# Patient Record
Sex: FEMALE | Race: WHITE | NOT HISPANIC OR LATINO | Employment: OTHER | URBAN - METROPOLITAN AREA
[De-identification: names, ages, dates, MRNs, and addresses within clinical notes are randomized per-mention and may not be internally consistent; named-entity substitution may affect disease eponyms.]

---

## 2017-01-30 ENCOUNTER — VIRTUAL VISIT (OUTPATIENT)
Dept: FAMILY MEDICINE | Facility: OTHER | Age: 62
End: 2017-01-30

## 2017-01-30 NOTE — PROGRESS NOTES
"Date: [id number]  Clinician: Joel Wegener  Clinician NPI: [id number]  Patient: Hilda Claire  Patient : 1955  Patient Address: [address] [place] [address]  Patient Phone: [phone]  Visit Protocol: URI  Patient Summary:  Hilda is a 61 year old ( : 1955 ) female who initiated a Zip for significant cough. When asked the question \"Do you have a [place] primary care physician?\", Hilda responded \"Yes\".     Her  symptoms started gradually 8-9 days ago  and consist of nasal congestion and cough.   She denies dysphagia, nausea, vomiting, itchy eyes, chills, malaise, fever, sore throat, hoarse voice, ear pain, dyspnea, post-nasal drainage, rhinitis, headache, facial pain or pressure, myalgias, chest pain, and loss of appetite. She denies a history of facial surgery.   Her minimal nasal secretions are clear. When asked to feel her neck she denied feeling enlarged lymph nodes. She denies axillary lymphadenopathy.   Her mild (a few coughs/hr) non-productive paroxysmal cough is more bothersome at night. Hilda reports having long periods without coughing but short periods of uncontrollable coughing. She feels fairly well when not coughing. She denies having trouble catching her breath when coughing, noticing a loud whooping sound if trying to catch her breath during a coughing fit and vomiting after a coughing episode since the cough began.  Hilda   She has tried medications to help the cough and found them to be partially effective.    Hilda denies having COPD or other chronic lung disease.   Pulse: self-reported pulse rate as: 10 beats in 10 seconds.    Weight (in lbs): 170   She states she is not pregnant and denies breastfeeding. She no longer menstruates.   Hilda does not smoke or use smokeless tobacco.   MEDICATIONS:  Thyroid, metoprolol (Lopressor/Toprol), and atorvastatin (Lipitor)   Patient free text response:  Tirosent  , ALLERGIES:  NKDA   Clinician Response: "  Dear Hilda,  Based on the information you have provided, you likely have a viral upper respiratory infection, otherwise known as a 'cold'.   Sinus pressure occurs when the tissues lining your sinuses become swollen and inflamed. Afrin nasal spray decreases the swelling to provide the quickest and most effective relief from sinus pressure.  Use oxymetazoline (Afrin, or store brand) nasal spray. Spray once in each nostril twice per day for a maximum of 3 days. Using this medication more frequently or longer than recommended may cause nasal congestion to reoccur or worsen. This is an over-the-counter medication you can find at most pharmacies.   Unless your are allergic to the over-the-counter medication(s) below, I recommend using:   Guaifenesin + dextromethorphan (Robitussin DM, Mucinex DM). This is an over-the-counter medication that does not require a prescription.   A decongestant such as pseudoephedrine (Sudafed or store brand) to help your symptoms. This is an over-the-counter medication that does not require a prescription.   Ibuprofen. Take 1-3 200mg tablets (200-600mg) every 8 hours to help with the discomfort. Make sure to take the ibuprofen with food. Do not exceed 2400mg in 24 hours. This is an over-the-counter medication that does not require a prescription.   Because your condition is most likely caused by a virus, antibiotics will not help you get better. Inappropriately treating a viral infection with antibiotics may cause harmful side-effects. In fact, antibiotics may make you feel worse.  For more information on why I am not prescribing antibiotics, please watch this video: Antibiotics Won't Help You.   Drink plenty of liquids, especially water and take time to rest your body. This may mean taking a nap or going to bed earlier. Your body is fighting an infection and liquids and rest will improve the pace of recovery. Remember to regularly wash your hands and avoid close contact with others to  prevent spreading your infection.  I am providing you with a promo code for a free visit. This code will  in two weeks and may only be used once. Please redeem your free visit by entering the following promo code on the payment screen: [id number]   Diagnosis: Viral URI  Diagnosis ICD: J06.9  Diagnosis ICD: 462.0

## 2017-03-21 ENCOUNTER — HOSPITAL ENCOUNTER (OUTPATIENT)
Dept: ULTRASOUND IMAGING | Facility: CLINIC | Age: 62
Discharge: HOME | End: 2017-03-21
Attending: PHYSICIAN ASSISTANT | Admitting: PHYSICIAN ASSISTANT
Payer: COMMERCIAL

## 2017-03-21 DIAGNOSIS — E04.1 THYROID NODULE: ICD-10-CM

## 2017-03-21 PROCEDURE — 76536 US EXAM OF HEAD AND NECK: CPT

## 2017-03-22 ENCOUNTER — TELEPHONE (OUTPATIENT)
Dept: FAMILY MEDICINE | Facility: CLINIC | Age: 62
End: 2017-03-22

## 2017-03-22 DIAGNOSIS — E06.3 HASHIMOTO'S THYROIDITIS: ICD-10-CM

## 2017-03-22 DIAGNOSIS — E04.1 THYROID NODULE: ICD-10-CM

## 2017-03-22 RX ORDER — LEVOTHYROXINE SODIUM 25 UG/1
25 CAPSULE ORAL DAILY
Qty: 90 CAPSULE | Refills: 0 | Status: SHIPPED | OUTPATIENT
Start: 2017-03-22 | End: 2017-06-14

## 2017-03-22 NOTE — TELEPHONE ENCOUNTER
Tirosint 25g        Last Written Prescription Date: 11/16/16  Last Fill Quantity: 90,  # refills: 0   Last Office Visit with G, P or [facility] prescribing provider: 11/11/16    Juan Daniel Ramos Pharmacy Technician  [facility]

## 2017-03-22 NOTE — TELEPHONE ENCOUNTER
Please do not close this encounter until this has been addressed.  (prior auth approved/denied, prescriber refusal to complete prior auth or medication changed/discontinued)    Prior Authorization needed on: Tirosint 25G  Drug NDC: [address]-19     Insurance: [employer]  Member ID: [id number]   Insurance phone #: [phone]    Pharmacy NPI: [id number]  Pharmacy Phone #: [phone]  Pharmacy Fax #: [phone]    Please let us know if the PA gets approved or denied or if medication is changed.     Juan Daniel Ramos Pharmacy Technician  [facility]

## 2017-03-22 NOTE — PROGRESS NOTES
Cristina Stevens,    Your thyroid ultrasound is stable, no change.  You do not need to repeat any further ultrasounds at this time.    Please call clinic at [phone] if you have questions.    Brionna Gonzalez PA-C

## 2017-06-14 DIAGNOSIS — E06.3 HASHIMOTO'S THYROIDITIS: ICD-10-CM

## 2017-06-14 DIAGNOSIS — E04.1 THYROID NODULE: ICD-10-CM

## 2017-06-14 RX ORDER — LEVOTHYROXINE SODIUM 25 UG/1
CAPSULE ORAL
Qty: 7 CAPSULE | Refills: 0 | Status: SHIPPED | OUTPATIENT
Start: 2017-06-14 | End: 2017-06-16 | Stop reason: DRUGHIGH

## 2017-06-14 NOTE — TELEPHONE ENCOUNTER
Needs lab before further refills.  1 refilled x only 1 week since this lab is extremely overdue, was to be done in 6-8 weeks after her Nov visit.

## 2017-06-14 NOTE — TELEPHONE ENCOUNTER
TIROSINT 25 MCG CAPS      Last Written Prescription Date:  3/22/17  Last Fill Quantity: 90,   # refills: 0  Last Office Visit with [facility], [facility] or  Health prescribing provider: 1/11/16  Future Office visit:       Routing refill request to provider for review/approval because:  Drug not on the [facility], [facility] or  [facility] refill protocol or controlled substance

## 2017-06-15 DIAGNOSIS — E04.1 THYROID NODULE: ICD-10-CM

## 2017-06-15 DIAGNOSIS — E06.3 HASHIMOTO'S THYROIDITIS: ICD-10-CM

## 2017-06-15 LAB
T4 FREE SERPL-MCNC: 0.73 NG/DL (ref 0.76–1.46)
TSH SERPL DL<=0.005 MIU/L-ACNC: 16.63 MU/L (ref 0.4–4)

## 2017-06-15 PROCEDURE — 36415 COLL VENOUS BLD VENIPUNCTURE: CPT | Performed by: PHYSICIAN ASSISTANT

## 2017-06-15 PROCEDURE — 84439 ASSAY OF FREE THYROXINE: CPT | Performed by: PHYSICIAN ASSISTANT

## 2017-06-15 PROCEDURE — 84443 ASSAY THYROID STIM HORMONE: CPT | Performed by: PHYSICIAN ASSISTANT

## 2017-06-16 DIAGNOSIS — E06.3 HYPOTHYROIDISM DUE TO HASHIMOTO'S THYROIDITIS: Primary | ICD-10-CM

## 2017-06-16 RX ORDER — LEVOTHYROXINE SODIUM 50 UG/1
50 CAPSULE ORAL DAILY
Qty: 60 CAPSULE | Refills: 0 | Status: SHIPPED | OUTPATIENT
Start: 2017-06-16 | End: 2017-08-21

## 2017-06-16 RX ORDER — LEVOTHYROXINE SODIUM 50 UG/1
50 TABLET ORAL DAILY
Qty: 60 TABLET | Refills: 0 | Status: SHIPPED | OUTPATIENT
Start: 2017-06-16 | End: 2017-06-16

## 2017-06-16 NOTE — PROGRESS NOTES
Cristina Stevens,    Your thyroid lab still shows that we need to adjust dose further.  I've sent the new dose into the [facility] pharmacy - 2 months of medication, since you need a lab in 6-8 weeks.  We can send elsewhere if you prefer.    Please call clinic at [phone] if you have questions.    Brionna Gonzalez PA-C

## 2017-08-21 DIAGNOSIS — E06.3 HYPOTHYROIDISM DUE TO HASHIMOTO'S THYROIDITIS: ICD-10-CM

## 2017-08-22 RX ORDER — LEVOTHYROXINE SODIUM 50 UG/1
CAPSULE ORAL
Qty: 56 CAPSULE | Refills: 0 | Status: SHIPPED | OUTPATIENT
Start: 2017-08-22 | End: 2017-10-12

## 2017-08-22 NOTE — TELEPHONE ENCOUNTER
tirosint      Last Written Prescription Date:    Last Fill Quantity: ,   # refills:   Last Office Visit with G, P or [facility] prescribing provider: 11/11/2016  Future Office visit:       Routing refill request to provider for review/approval because:  Drug not active on patient's medication list

## 2017-10-12 DIAGNOSIS — E06.3 HYPOTHYROIDISM DUE TO HASHIMOTO'S THYROIDITIS: ICD-10-CM

## 2017-10-13 RX ORDER — LEVOTHYROXINE SODIUM 50 UG/1
CAPSULE ORAL
Qty: 14 CAPSULE | Refills: 0 | Status: SHIPPED | OUTPATIENT
Start: 2017-10-13 | End: 2017-10-18 | Stop reason: DRUGHIGH

## 2017-10-13 NOTE — TELEPHONE ENCOUNTER
TIROSINT 50 MCG CAPS      Last Written Prescription Date:  8/22/2017  Last Fill Quantity: 56,   # refills: 0  Last Office Visit with [facility], P or  Health prescribing provider: 11/11/2016  Future Office visit:       Routing refill request to provider for review/approval because:  Drug not on the [facility], [facility] or  Health refill protocol or controlled substance

## 2017-10-17 DIAGNOSIS — N18.30 CKD (CHRONIC KIDNEY DISEASE) STAGE 3, GFR 30-59 ML/MIN (H): ICD-10-CM

## 2017-10-17 DIAGNOSIS — E06.3 HASHIMOTO'S THYROIDITIS: ICD-10-CM

## 2017-10-17 DIAGNOSIS — E06.3 HYPOTHYROIDISM DUE TO HASHIMOTO'S THYROIDITIS: ICD-10-CM

## 2017-10-17 PROCEDURE — 84443 ASSAY THYROID STIM HORMONE: CPT | Performed by: PHYSICIAN ASSISTANT

## 2017-10-17 PROCEDURE — 36415 COLL VENOUS BLD VENIPUNCTURE: CPT | Performed by: PHYSICIAN ASSISTANT

## 2017-10-18 LAB — TSH SERPL DL<=0.005 MIU/L-ACNC: 13.9 MU/L (ref 0.4–4)

## 2017-10-18 RX ORDER — LEVOTHYROXINE SODIUM 75 UG/1
75 CAPSULE ORAL DAILY
Qty: 90 CAPSULE | Refills: 0 | Status: SHIPPED | OUTPATIENT
Start: 2017-10-18 | End: 2017-12-29

## 2017-10-18 NOTE — PROGRESS NOTES
Cristina Stevens,    Your thyroid lab shows that you need a dose increase.  I know that you hadn't wanted any dose increases, but lab shows that we do need to do this.  I've sent 90 days worth of medication to pharmacy.  You must do lab in 6-12 weeks, and see me yearly (due in Nov), to refill.      Please call clinic at [phone] if you have questions.    Brionna Gonzalez PA-C

## 2017-11-17 ENCOUNTER — OFFICE VISIT (OUTPATIENT)
Dept: FAMILY MEDICINE | Facility: CLINIC | Age: 62
End: 2017-11-17
Payer: COMMERCIAL

## 2017-11-17 VITALS
DIASTOLIC BLOOD PRESSURE: 86 MMHG | TEMPERATURE: 98.3 F | HEIGHT: 62 IN | BODY MASS INDEX: 33.13 KG/M2 | HEART RATE: 60 BPM | SYSTOLIC BLOOD PRESSURE: 132 MMHG | WEIGHT: 180 LBS

## 2017-11-17 DIAGNOSIS — E78.5 DYSLIPIDEMIA: ICD-10-CM

## 2017-11-17 DIAGNOSIS — Z86.73 HISTORY OF STROKE: ICD-10-CM

## 2017-11-17 DIAGNOSIS — N18.30 CKD (CHRONIC KIDNEY DISEASE) STAGE 3, GFR 30-59 ML/MIN (H): ICD-10-CM

## 2017-11-17 DIAGNOSIS — R73.03 PREDIABETES: ICD-10-CM

## 2017-11-17 DIAGNOSIS — E06.3 HYPOTHYROIDISM DUE TO HASHIMOTO'S THYROIDITIS: ICD-10-CM

## 2017-11-17 DIAGNOSIS — R00.1 BRADYCARDIA: ICD-10-CM

## 2017-11-17 DIAGNOSIS — Z00.00 ROUTINE PHYSICAL EXAMINATION: Primary | ICD-10-CM

## 2017-11-17 DIAGNOSIS — I10 ESSENTIAL HYPERTENSION WITH GOAL BLOOD PRESSURE LESS THAN 140/90: ICD-10-CM

## 2017-11-17 LAB
CHOLEST SERPL-MCNC: 171 MG/DL
CREAT SERPL-MCNC: 1.03 MG/DL (ref 0.52–1.04)
CREAT UR-MCNC: 16 MG/DL
GFR SERPL CREATININE-BSD FRML MDRD: 55 ML/MIN/1.7M2
HBA1C MFR BLD: 6.4 % (ref 4.3–6)
HDLC SERPL-MCNC: 71 MG/DL
HGB BLD-MCNC: 13.8 G/DL (ref 11.7–15.7)
LDLC SERPL CALC-MCNC: 61 MG/DL
MICROALBUMIN UR-MCNC: <5 MG/L
MICROALBUMIN/CREAT UR: NORMAL MG/G CR (ref 0–25)
NONHDLC SERPL-MCNC: 100 MG/DL
TRIGL SERPL-MCNC: 195 MG/DL

## 2017-11-17 PROCEDURE — 83036 HEMOGLOBIN GLYCOSYLATED A1C: CPT | Performed by: PHYSICIAN ASSISTANT

## 2017-11-17 PROCEDURE — 82565 ASSAY OF CREATININE: CPT | Performed by: PHYSICIAN ASSISTANT

## 2017-11-17 PROCEDURE — 99396 PREV VISIT EST AGE 40-64: CPT | Performed by: PHYSICIAN ASSISTANT

## 2017-11-17 PROCEDURE — 82043 UR ALBUMIN QUANTITATIVE: CPT | Performed by: PHYSICIAN ASSISTANT

## 2017-11-17 PROCEDURE — 99214 OFFICE O/P EST MOD 30 MIN: CPT | Mod: 25 | Performed by: PHYSICIAN ASSISTANT

## 2017-11-17 PROCEDURE — 85018 HEMOGLOBIN: CPT | Performed by: PHYSICIAN ASSISTANT

## 2017-11-17 PROCEDURE — 80061 LIPID PANEL: CPT | Performed by: PHYSICIAN ASSISTANT

## 2017-11-17 PROCEDURE — 36415 COLL VENOUS BLD VENIPUNCTURE: CPT | Performed by: PHYSICIAN ASSISTANT

## 2017-11-17 RX ORDER — METOPROLOL TARTRATE 50 MG
TABLET ORAL
Qty: 60 TABLET | Refills: 0 | Status: SHIPPED | OUTPATIENT
Start: 2017-11-17 | End: 2018-04-03

## 2017-11-17 RX ORDER — AMLODIPINE BESYLATE 5 MG/1
5 TABLET ORAL DAILY
Qty: 90 TABLET | Refills: 3 | Status: CANCELLED | OUTPATIENT
Start: 2017-11-17

## 2017-11-17 RX ORDER — ATORVASTATIN CALCIUM 80 MG/1
80 TABLET, FILM COATED ORAL DAILY
Qty: 90 TABLET | Refills: 3 | Status: SHIPPED | OUTPATIENT
Start: 2017-11-17 | End: 2018-11-16

## 2017-11-17 RX ORDER — LEVOTHYROXINE SODIUM 50 UG/1
CAPSULE ORAL
Qty: 90 CAPSULE | Refills: 3 | Status: SHIPPED | OUTPATIENT
Start: 2017-11-17 | End: 2018-10-17

## 2017-11-17 RX ORDER — LISINOPRIL 20 MG/1
20 TABLET ORAL DAILY
Qty: 30 TABLET | Refills: 0 | Status: SHIPPED | OUTPATIENT
Start: 2017-11-17 | End: 2017-12-27

## 2017-11-17 NOTE — NURSING NOTE
"Chief Complaint   Patient presents with     Physical       Initial /86 (BP Location: Right arm, Patient Position: Chair, Cuff Size: Adult Large)  Pulse 60  Temp 98.3  F (36.8  C) (Tympanic)  Ht 5' 2\" (1.575 m)  Wt 180 lb (81.6 kg)  LMP 06/02/2008  BMI 32.92 kg/m2 Estimated body mass index is 32.92 kg/(m^2) as calculated from the following:    Height as of this encounter: 5' 2\" (1.575 m).    Weight as of this encounter: 180 lb (81.6 kg).  Medication Reconciliation: complete    Health Maintenance that is potentially due pending provider review:  Mammogram and Colonoscopy/FIT    Gave pt phone number/pended order to schedule mammo and/or colonoscopy(or FIT)    Is there anyone who you would like to be able to receive your results? No  If yes have patient fill out REMEDIOS    Edie BRANCH MA      "

## 2017-11-17 NOTE — MR AVS SNAPSHOT
After Visit Summary   11/17/2017    Hilda Claire    MRN: [id number]           Patient Information     Date Of Birth          1955        Visit Information        Provider Department      11/17/2017 9:40 AM Brionna Gonzalez PA-C [facility]        Today's Diagnoses     Routine physical examination    -  1    Bradycardia        CKD (chronic kidney disease) stage 3, GFR 30-59 ml/min        Essential hypertension with goal blood pressure less than 140/90        Hypothyroidism due to Hashimoto's thyroiditis        Prediabetes        History of stroke        Dyslipidemia          Care Instructions    1 tab daily.  This a lower dose than my medical recommendation but what patient is willing to take.  Advising to see endocrinology.    Decrease metoprolol to 0.5 tab twice daily.  Update me on BPs AND pulses within the month for refills.    Due to tired kidneys, we prefer lisinopril as a BP med.  Therefore start lisinopril and stop amlodipine.  Lab again in 3-4 wks - but make sure BP is doing well first.        Tetanus shot due in June.    Think about flu shot.    In a few months consider NEW shingles vaccine shingrix.  Check with insurance first or get at our pharmacy.      Keep up your awesome exercise.      Consider mail the poop test.      [facility] Mammo Schedule  [facility] ~ [phone]  One Day Weekly- Alternating Days    [place] ~ [phone]  Every other Monday or Wednesday   & one Saturday morning a month    [place] ~ [phone]  Every Other Monday Morning    [place] ~ [phone]  Every Other Monday Afternoon    [place] ~ [phone]  Every Monday morning  Every Tuesday afternoon      Wed, Thurs, Friday morning & afternoon        Preventive Health Recommendations  Female Ages 50 - 64    Yearly exam: See your health care provider every year in order to  o Review health changes.   o Discuss preventive care.    o Review your medicines if your doctor has  prescribed any.      Get a Pap test every three years (unless you have an abnormal result and your provider advises testing more often).    If you get Pap tests with HPV test, you only need to test every 5 years, unless you have an abnormal result.     You do not need a Pap test if your uterus was removed (hysterectomy) and you have not had cancer.    You should be tested each year for STDs (sexually transmitted diseases) if you're at risk.     Have a mammogram every 1 to 2 years.    Have a colonoscopy at age 50, or have a yearly FIT test (stool test). These exams screen for colon cancer.      Have a cholesterol test every 5 years, or more often if advised.    Have a diabetes test (fasting glucose) every three years. If you are at risk for diabetes, you should have this test more often.     If you are at risk for osteoporosis (brittle bone disease), think about having a bone density scan (DEXA).    Shots: Get a flu shot each year. Get a tetanus shot every 10 years.    Nutrition:     Eat at least 5 servings of fruits and vegetables each day.    Eat whole-grain bread, whole-wheat pasta and brown rice instead of white grains and rice.    Talk to your provider about Calcium and Vitamin D.     Lifestyle    Exercise at least 150 minutes a week (30 minutes a day, 5 days a week). This will help you control your weight and prevent disease.    Limit alcohol to one drink per day.    No smoking.     Wear sunscreen to prevent skin cancer.     See your dentist every six months for an exam and cleaning.    See your eye doctor every 1 to 2 years.            Follow-ups after your visit        Who to contact     If you have questions or need follow up information about today's clinic visit or your schedule please contact [facility] directly at [phone].  Normal or non-critical lab and imaging results will be communicated to you by MyChart, letter or phone within 4 business days after the clinic has received the  "results. If you do not hear from us within 7 days, please contact the clinic through [employer] or phone. If you have a critical or abnormal lab result, we will notify you by phone as soon as possible.  Submit refill requests through [employer] or call your pharmacy and they will forward the refill request to us. Please allow 3 business days for your refill to be completed.          Additional Information About Your Visit        [employer] Information     [employer] gives you secure access to your electronic health record. If you see a primary care provider, you can also send messages to your care team and make appointments. If you have questions, please call your primary care clinic.  If you do not have a primary care provider, please call [phone] and they will assist you.        Care EveryWhere ID     This is your Care EveryWhere ID. This could be used by other organizations to access your [facility] medical records  [id number]        Your Vitals Were     Pulse Temperature Height Last Period BMI (Body Mass Index)       60 98.3  F (36.8  C) (Tympanic) 5' 2\" (1.575 m) 06/02/2008 32.92 kg/m2        Blood Pressure from Last 3 Encounters:   11/17/17 132/86   11/11/16 128/76   03/01/16 108/56    Weight from Last 3 Encounters:   11/17/17 180 lb (81.6 kg)   11/11/16 182 lb 9.6 oz (82.8 kg)   01/21/16 174 lb 14.4 oz (79.3 kg)              We Performed the Following     Albumin Random Urine Quantitative with Creat Ratio     Creatinine     Hemoglobin A1c     Hemoglobin     Lipid panel reflex to direct LDL Fasting          Today's Medication Changes          These changes are accurate as of: 11/17/17 10:32 AM.  If you have any questions, ask your nurse or doctor.               Start taking these medicines.        Dose/Directions    lisinopril 20 MG tablet   Commonly known as:  PRINIVIL/ZESTRIL   Used for:  CKD (chronic kidney disease) stage 3, GFR 30-59 ml/min, Essential hypertension with goal blood pressure less than 140/90 "   Started by:  Brionna Gonzalez PA-C        Dose:  20 mg   Take 1 tablet (20 mg) by mouth daily   Quantity:  30 tablet   Refills:  0         These medicines have changed or have updated prescriptions.        Dose/Directions    * Levothyroxine Sodium 75 MCG Caps   Commonly known as:  TIROSINT   This may have changed:  Another medication with the same name was added. Make sure you understand how and when to take each.        Dose:  75 mcg   Take 0.075 mg by mouth daily Lab in 6-12 weeks for any refills.   Quantity:  90 capsule   Refills:  0       * TIROSINT 50 MCG Caps   This may have changed:  You were already taking a medication with the same name, and this prescription was added. Make sure you understand how and when to take each.   Used for:  Hypothyroidism due to Hashimoto's thyroiditis   Generic drug:  Levothyroxine Sodium   Changed by:  Brionna Gonzalez PA-C        1 tab daily.  This a lower dose than my medical recommendation but what patient is willing to take.  Advising to see endocrinology.   Quantity:  90 capsule   Refills:  3       metoprolol 50 MG tablet   Commonly known as:  LOPRESSOR   This may have changed:  additional instructions   Used for:  Essential hypertension with goal blood pressure less than 140/90   Changed by:  Brionna Gonzalez PA-C        Take 0.5 tablet in am and 0.5 tablets in pm   Quantity:  60 tablet   Refills:  0       * Notice:  This list has 2 medication(s) that are the same as other medications prescribed for you. Read the directions carefully, and ask your doctor or other care provider to review them with you.         Where to get your medicines      These medications were sent to [facility] [place] [id number] [place] [address]     Phone:  [phone]     atorvastatin 80 MG tablet    lisinopril 20 MG tablet    metoprolol 50 MG tablet    TIROSINT 50 MCG Caps                Primary Care Provider  Office Phone # Fax #    Brionna Gonzalez PA-C [phone] [address] [place] [address]        Equal Access to Services     GISELLE ALMEIDA : Shelby onel atkins igor Duffy, washirazda luqadaha, qaybta kaalmada loida, juan garciabrigitte holloway. So [facility] [phone].    ATENCIÓN: Si habla español, tiene a barry disposición servicios gratuitos de asistencia lingüística. Llame al [phone].    We comply with applicable federal civil rights laws and [place] laws. We do not discriminate on the basis of race, color, national origin, age, disability, sex, sexual orientation, or gender identity.            Thank you!     Thank you for choosing [facility]  for your care. Our goal is always to provide you with excellent care. Hearing back from our patients is one way we can continue to improve our services. Please take a few minutes to complete the written survey that you may receive in the mail after your visit with us. Thank you!             Your Updated Medication List - Protect others around you: Learn how to safely use, store and throw away your medicines at www.disposemymeds.org.          This list is accurate as of: 11/17/17 10:32 AM.  Always use your most recent med list.                   Brand Name Dispense Instructions for use Diagnosis    amLODIPine 5 MG tablet    NORVASC    90 tablet    Take 1 tablet (5 mg) by mouth daily    Essential hypertension with goal blood pressure less than 140/90       aspirin  MG EC tablet      Take 1 tablet (325 mg) by mouth daily For stroke prevention.    Cerebrovascular accident (CVA) due to other mechanism (H)       atorvastatin 80 MG tablet    LIPITOR    90 tablet    Take 1 tablet (80 mg) by mouth daily    Dyslipidemia       * Levothyroxine Sodium 75 MCG Caps    TIROSINT    90 capsule    Take 0.075 mg by mouth daily Lab in 6-12 weeks for any refills.        * TIROSINT 50 MCG Caps   Generic drug:  Levothyroxine  Sodium     90 capsule    1 tab daily.  This a lower dose than my medical recommendation but what patient is willing to take.  Advising to see endocrinology.    Hypothyroidism due to Hashimoto's thyroiditis       lisinopril 20 MG tablet    PRINIVIL/ZESTRIL    30 tablet    Take 1 tablet (20 mg) by mouth daily    CKD (chronic kidney disease) stage 3, GFR 30-59 ml/min, Essential hypertension with goal blood pressure less than 140/90       loperamide 2 MG capsule    IMODIUM     Take 2 mg by mouth 4 times daily as needed for diarrhea        metoprolol 50 MG tablet    LOPRESSOR    60 tablet    Take 0.5 tablet in am and 0.5 tablets in pm    Essential hypertension with goal blood pressure less than 140/90       * Notice:  This list has 2 medication(s) that are the same as other medications prescribed for you. Read the directions carefully, and ask your doctor or other care provider to review them with you.

## 2017-11-17 NOTE — PATIENT INSTRUCTIONS
1 tab daily.  This a lower dose than my medical recommendation but what patient is willing to take.  Advising to see endocrinology.    Decrease metoprolol to 0.5 tab twice daily.  Update me on BPs AND pulses within the month for refills.    Due to tired kidneys, we prefer lisinopril as a BP med.  Therefore start lisinopril and stop amlodipine.  Lab again in 3-4 wks - but make sure BP is doing well first.        Tetanus shot due in June.    Think about flu shot.    In a few months consider NEW shingles vaccine shingrix.  Check with insurance first or get at our pharmacy.      Keep up your awesome exercise.      Consider mail the poop test.      [facility] Mammo Schedule  [facility] ~ [phone]  One Day Weekly- Alternating Days    [place] ~ [phone]  Every other Monday or Wednesday   & one Saturday morning a month    [place] ~ [phone]  Every Other Monday Morning    [place] ~ [phone]  Every Other Monday Afternoon    [place] ~ [phone]  Every Monday morning  Every Tuesday afternoon      Wed, Thurs, Friday morning & afternoon        Preventive Health Recommendations  Female Ages 50 - 64    Yearly exam: See your health care provider every year in order to  o Review health changes.   o Discuss preventive care.    o Review your medicines if your doctor has prescribed any.      Get a Pap test every three years (unless you have an abnormal result and your provider advises testing more often).    If you get Pap tests with HPV test, you only need to test every 5 years, unless you have an abnormal result.     You do not need a Pap test if your uterus was removed (hysterectomy) and you have not had cancer.    You should be tested each year for STDs (sexually transmitted diseases) if you're at risk.     Have a mammogram every 1 to 2 years.    Have a colonoscopy at age 50, or have a yearly FIT test (stool test). These exams screen for colon cancer.      Have a cholesterol test every 5 years, or more  often if advised.    Have a diabetes test (fasting glucose) every three years. If you are at risk for diabetes, you should have this test more often.     If you are at risk for osteoporosis (brittle bone disease), think about having a bone density scan (DEXA).    Shots: Get a flu shot each year. Get a tetanus shot every 10 years.    Nutrition:     Eat at least 5 servings of fruits and vegetables each day.    Eat whole-grain bread, whole-wheat pasta and brown rice instead of white grains and rice.    Talk to your provider about Calcium and Vitamin D.     Lifestyle    Exercise at least 150 minutes a week (30 minutes a day, 5 days a week). This will help you control your weight and prevent disease.    Limit alcohol to one drink per day.    No smoking.     Wear sunscreen to prevent skin cancer.     See your dentist every six months for an exam and cleaning.    See your eye doctor every 1 to 2 years.

## 2017-11-17 NOTE — PROGRESS NOTES
SUBJECTIVE:   CC: Hilda Claire is an 62 year old woman who presents for preventive health visit.     Physical   Annual:     Getting at least 3 servings of Calcium per day::  Yes    Bi-annual eye exam::  Yes    Dental care twice a year::  Yes    Sleep apnea or symptoms of sleep apnea::  None    Diet::  Regular (no restrictions)    Frequency of exercise::  4-5 days/week    Duration of exercise::  15-30 minutes    Taking medications regularly::  Yes    Additional concerns today::  No    History of stroke.  HTN.  130s systolic.  No chest pains, chest pressures, SOB or sudden change of endurance.   Notes pulse sometimes in 60s and even 50s but asymptomatic.   CKD3.  She was unaware of this.      Hypothyroid.  See notes from last yr.  Patient dislikes thyroid med and is currently not taking.  Only willing to take lower dose which she felt she tolerated, and not willing to consider seeing endocrine at this time.  Feels she has no symptoms.    Declines mammogram and FIT.  Might get around to mammo but states no to FIT for now.    Tetanus due in June.    Works for [employer].    Today's PHQ-2 Score:   PHQ-2 ( 1999 Pfizer) 11/16/2017   Q1: Little interest or pleasure in doing things 0   Q2: Feeling down, depressed or hopeless 0   PHQ-2 Score 0   Q1: Little interest or pleasure in doing things Not at all   Q2: Feeling down, depressed or hopeless Not at all   PHQ-2 Score 0       Abuse: Current or Past(Physical, Sexual or Emotional)- No  Do you feel safe in your environment - Yes    Social History   Substance Use Topics     Smoking status: Never Smoker     Smokeless tobacco: Not on file     Alcohol use Yes      Comment: occ     The patient does not drink >3 drinks per day nor >7 drinks per week.    Reviewed orders with patient.  Reviewed health maintenance and updated orders accordingly - Yes  Labs reviewed in EPIC  BP Readings from Last 3 Encounters:   11/17/17 132/86   11/11/16 128/76  "  03/01/16 108/56    Wt Readings from Last 3 Encounters:   11/17/17 180 lb (81.6 kg)   11/11/16 182 lb 9.6 oz (82.8 kg)   01/21/16 174 lb 14.4 oz (79.3 kg)         Patient over age 50, mutual decision to screen reflected in health maintenance.      Pertinent mammograms are reviewed under the imaging tab.  History of abnormal Pap smear: NO - age 30-65 PAP every 5 years with negative HPV co-testing recommended    Reviewed and updated as needed this visit by clinical staff         Reviewed and updated as needed this visit by Provider          Review of Systems  C: NEGATIVE for fever, chills, change in weight  I: NEGATIVE for worrisome rashes, moles or lesions  E: NEGATIVE for vision changes or irritation  ENT: NEGATIVE for ear, mouth and throat problems  R: NEGATIVE for significant cough or SOB  B: NEGATIVE for masses, tenderness or discharge  CV: NEGATIVE for chest pain, palpitations or peripheral edema  GI: NEGATIVE for nausea, abdominal pain, heartburn, or change in bowel habits  : NEGATIVE for unusual urinary or vaginal symptoms. No vaginal bleeding.  M: NEGATIVE for significant arthralgias or myalgia  N: NEGATIVE for weakness, dizziness or paresthesias  P: NEGATIVE for changes in mood or affect     ROS is negative except as listed above in ROS or in HPI.     OBJECTIVE:   /86 (BP Location: Right arm, Patient Position: Chair, Cuff Size: Adult Large)  Pulse 60  Temp 98.3  F (36.8  C) (Tympanic)  Ht 5' 2\" (1.575 m)  Wt 180 lb (81.6 kg)  LMP 06/02/2008  BMI 32.92 kg/m2  Physical Exam  GENERAL APPEARANCE: healthy, alert and no distress  EYES: Eyes grossly normal to inspection, PERRL and conjunctivae and sclerae normal  HENT: ear canals and TM's normal, nose and mouth without ulcers or lesions, oropharynx clear and oral mucous membranes moist  NECK: no adenopathy, no asymmetry, masses, or scars and thyroid normal to palpation  RESP: lungs clear to auscultation - no rales, rhonchi or wheezes  BREAST: normal " "without masses, tenderness or nipple discharge and no palpable axillary masses or adenopathy  CV: regular rate and rhythm, normal S1 S2, no S3 or S4, no murmur, click or rub, no peripheral edema and peripheral pulses strong  ABDOMEN: soft, nontender, no hepatosplenomegaly, no masses and bowel sounds normal  MS: no musculoskeletal defects are noted and gait is age appropriate without ataxia  SKIN: no suspicious lesions or rashes  NEURO: Normal strength and tone, sensory exam grossly normal, mentation intact and speech normal  PSYCH: mentation appears normal and affect normal/bright    ASSESSMENT/PLAN:       ICD-10-CM    1. Routine physical examination Z00.00    2. Bradycardia R00.1    3. CKD (chronic kidney disease) stage 3, GFR 30-59 ml/min N18.3 Creatinine     Albumin Random Urine Quantitative with Creat Ratio     Hemoglobin     lisinopril (PRINIVIL/ZESTRIL) 20 MG tablet   4. Essential hypertension with goal blood pressure less than 140/90 I10 metoprolol (LOPRESSOR) 50 MG tablet     lisinopril (PRINIVIL/ZESTRIL) 20 MG tablet   5. Hypothyroidism due to Hashimoto's thyroiditis E03.8 TIROSINT 50 MCG CAPS    E06.3    6. Prediabetes R73.03 Hemoglobin A1c   7. History of stroke Z86.73 Lipid panel reflex to direct LDL Fasting   8. Dyslipidemia E78.5 Lipid panel reflex to direct LDL Fasting     atorvastatin (LIPITOR) 80 MG tablet     COUNSELING:  Reviewed preventive health counseling, as reflected in patient instructions       Regular exercise       Healthy diet/nutrition       reports that she has never smoked. She does not have any smokeless tobacco history on file.    Estimated body mass index is 32.6 kg/(m^2) as calculated from the following:    Height as of 11/11/16: 5' 2.75\" (1.594 m).    Weight as of 11/11/16: 182 lb 9.6 oz (82.8 kg).   Weight management plan: Discussed healthy diet and exercise guidelines and patient will follow up in 12 months in clinic to re-evaluate.    Counseling Resources:  ATP IV " Guidelines  Pooled Cohorts Equation Calculator  Breast Cancer Risk Calculator  FRAX Risk Assessment  ICSI Preventive Guidelines  Dietary Guidelines for Americans, 2010  [email or web address]'s MyPlate  ASA Prophylaxis  Lung CA Screening    Brionna Gonzalez PA-C  [facility]      Patient Instructions   1 tab daily.  This a lower dose than my medical recommendation but what patient is willing to take.  Advising to see endocrinology.    Decrease metoprolol to 0.5 tab twice daily.  Update me on BPs AND pulses within the month for refills.    Due to tired kidneys, we prefer lisinopril as a BP med.  Therefore start lisinopril and stop amlodipine.  Lab again in 3-4 wks - but make sure BP is doing well first.        Tetanus shot due in June.    Think about flu shot.    In a few months consider NEW shingles vaccine shingrix.  Check with insurance first or get at our pharmacy.      Keep up your awesome exercise.      Consider mail the poop test.      [facility] Mammo Schedule  [facility] ~ [phone]  One Day Weekly- Alternating Days    [place] ~ [phone]  Every other Monday or Wednesday   & one Saturday morning a month    [place] ~ [phone]  Every Other Monday Morning    [place] ~ [phone]  Every Other Monday Afternoon    [place] ~ [phone]  Every Monday morning  Every Tuesday afternoon      Wed, Thurs, Friday morning & afternoon        Preventive Health Recommendations  Female Ages 50 - 64    Yearly exam: See your health care provider every year in order to  o Review health changes.   o Discuss preventive care.    o Review your medicines if your doctor has prescribed any.      Get a Pap test every three years (unless you have an abnormal result and your provider advises testing more often).    If you get Pap tests with HPV test, you only need to test every 5 years, unless you have an abnormal result.     You do not need a Pap test if your uterus was removed (hysterectomy) and you have not  had cancer.    You should be tested each year for STDs (sexually transmitted diseases) if you're at risk.     Have a mammogram every 1 to 2 years.    Have a colonoscopy at age 50, or have a yearly FIT test (stool test). These exams screen for colon cancer.      Have a cholesterol test every 5 years, or more often if advised.    Have a diabetes test (fasting glucose) every three years. If you are at risk for diabetes, you should have this test more often.     If you are at risk for osteoporosis (brittle bone disease), think about having a bone density scan (DEXA).    Shots: Get a flu shot each year. Get a tetanus shot every 10 years.    Nutrition:     Eat at least 5 servings of fruits and vegetables each day.    Eat whole-grain bread, whole-wheat pasta and brown rice instead of white grains and rice.    Talk to your provider about Calcium and Vitamin D.     Lifestyle    Exercise at least 150 minutes a week (30 minutes a day, 5 days a week). This will help you control your weight and prevent disease.    Limit alcohol to one drink per day.    No smoking.     Wear sunscreen to prevent skin cancer.     See your dentist every six months for an exam and cleaning.    See your eye doctor every 1 to 2 years.

## 2017-11-17 NOTE — PROGRESS NOTES
Cristina Stevens,    Your cholesterol looks great.  Kidney function is stable.  Urine protein test normal.  A1c, which looks at blood sugars over the past 3 months, is very close to diabetes.  6.5 is diabetes, and your lab is at 6.4.  If you do not make any lifestyle changes or have any weight loss, you will likely be diabetic next year.  Even 10 pounds of weight loss can make a difference.  Let me know if you want to attend a class on prediabetes.  Otherwise, please know that under-treated thyroid can impact this as well.  I would again encourage you to see endocrinology for your thyroid.    Please call clinic at [phone] if you have questions.    Brionna Gonzalez PA-C

## 2017-11-17 NOTE — PROGRESS NOTES
"   SUBJECTIVE:   CC: Hilda Claire is an 62 year old woman who presents for preventive health visit.     Healthy Habits:    Do you get at least three servings of calcium containing foods daily (dairy, green leafy vegetables, etc.)? {YES/NO, DAIRY INTAKE:954758::\"yes\"}    Amount of exercise or daily activities, outside of work: {AMOUNT EXERCISE:142100}    Problems taking medications regularly {Yes /No default:806138::\"No\"}    Medication side effects: {Yes /No default.:907129::\"No\"}    Have you had an eye exam in the past two years? {YESNOBLANK:834051}    Do you see a dentist twice per year? {YESNOBLANK:048733}    Do you have sleep apnea, excessive snoring or daytime drowsiness?{YESNOBLANK:692617}    {Outside tests to abstract? :937981}    {additional problems to add (Optional):496125}    Today's PHQ-2 Score:   PHQ-2 ( 1999 Pfizer) 11/16/2017 11/11/2016   Q1: Little interest or pleasure in doing things 0 0   Q2: Feeling down, depressed or hopeless 0 0   PHQ-2 Score 0 0   Q1: Little interest or pleasure in doing things Not at all -   Q2: Feeling down, depressed or hopeless Not at all -   PHQ-2 Score 0 -     {PHQ-2 LOOK IN ASSESSMENTS (Optional) :069509}  Abuse: Current or Past(Physical, Sexual or Emotional)- {YES/NO/NA:589866}  Do you feel safe in your environment - {YES/NO/NA:474171}    Social History   Substance Use Topics     Smoking status: Never Smoker     Smokeless tobacco: Not on file     Alcohol use Yes      Comment: occ     {ETOH AUDIT:445540}    Reviewed orders with patient.  Reviewed health maintenance and updated orders accordingly - {Yes/No:183840::\"Yes\"}  {Chronicprobdata (Optional):324283}    {Mammo Decision Support (Optional):026093}    Pertinent mammograms are reviewed under the imaging tab.  History of abnormal Pap smear: {PAP HX:131830}    Reviewed and updated as needed this visit by clinical staff         Reviewed and updated as needed this visit by Provider        {HISTORY OPTIONS " "(Optional):683191}    ROS:  {FEMALE PREVENTATIVE ROS:941268}    OBJECTIVE:   LMP 06/02/2008  EXAM:  {Exam Choices:051036}    ASSESSMENT/PLAN:   {Diag Picklist:206181}    COUNSELING:   {FEMALE COUNSELING MESSAGES:233876::\"Reviewed preventive health counseling, as reflected in patient instructions\"}    {BP Counseling- Complete if BP >= 120/80  (Optional):028586}     reports that she has never smoked. She does not have any smokeless tobacco history on file.  {Tobacco Cessation -- Complete if patient is a smoker (Optional):474418}  Estimated body mass index is 32.6 kg/(m^2) as calculated from the following:    Height as of 11/11/16: 5' 2.75\" (1.594 m).    Weight as of 11/11/16: 182 lb 9.6 oz (82.8 kg).   {Weight Management Plan (ACO) Complete if BMI is abnormal-  Ages 18-64  BMI >24.9.  Age 65+ with BMI <23 or >30 (Optional):962803}    Counseling Resources:  ATP IV Guidelines  Pooled Cohorts Equation Calculator  Breast Cancer Risk Calculator  FRAX Risk Assessment  ICSI Preventive Guidelines  Dietary Guidelines for Americans, 2010  USDA's MyPlate  ASA Prophylaxis  Lung CA Screening    Brionna Gonzalez PA-C  [facility]  "

## 2017-12-22 ENCOUNTER — DOCUMENTATION ONLY (OUTPATIENT)
Dept: LAB | Facility: CLINIC | Age: 62
End: 2017-12-22

## 2017-12-22 DIAGNOSIS — I10 ESSENTIAL HYPERTENSION WITH GOAL BLOOD PRESSURE LESS THAN 140/90: ICD-10-CM

## 2017-12-22 DIAGNOSIS — N18.30 CKD (CHRONIC KIDNEY DISEASE) STAGE 3, GFR 30-59 ML/MIN (H): ICD-10-CM

## 2017-12-22 DIAGNOSIS — N18.30 CKD (CHRONIC KIDNEY DISEASE) STAGE 3, GFR 30-59 ML/MIN (H): Primary | ICD-10-CM

## 2017-12-22 PROCEDURE — 36415 COLL VENOUS BLD VENIPUNCTURE: CPT | Performed by: PHYSICIAN ASSISTANT

## 2017-12-22 PROCEDURE — 80048 BASIC METABOLIC PNL TOTAL CA: CPT | Performed by: PHYSICIAN ASSISTANT

## 2017-12-23 LAB
ANION GAP SERPL CALCULATED.3IONS-SCNC: 6 MMOL/L (ref 3–14)
BUN SERPL-MCNC: 15 MG/DL (ref 7–30)
CALCIUM SERPL-MCNC: 9.6 MG/DL (ref 8.5–10.1)
CHLORIDE SERPL-SCNC: 100 MMOL/L (ref 94–109)
CO2 SERPL-SCNC: 29 MMOL/L (ref 20–32)
CREAT SERPL-MCNC: 0.91 MG/DL (ref 0.52–1.04)
GFR SERPL CREATININE-BSD FRML MDRD: 63 ML/MIN/1.7M2
GLUCOSE SERPL-MCNC: 118 MG/DL (ref 70–99)
POTASSIUM SERPL-SCNC: 3.8 MMOL/L (ref 3.4–5.3)
SODIUM SERPL-SCNC: 135 MMOL/L (ref 133–144)

## 2017-12-27 RX ORDER — LISINOPRIL 20 MG/1
20 TABLET ORAL DAILY
Qty: 90 TABLET | Refills: 3 | Status: SHIPPED | OUTPATIENT
Start: 2017-12-27 | End: 2018-01-17

## 2017-12-29 ENCOUNTER — OFFICE VISIT (OUTPATIENT)
Dept: FAMILY MEDICINE | Facility: CLINIC | Age: 62
End: 2017-12-29
Payer: COMMERCIAL

## 2017-12-29 VITALS
DIASTOLIC BLOOD PRESSURE: 77 MMHG | WEIGHT: 175 LBS | HEART RATE: 65 BPM | SYSTOLIC BLOOD PRESSURE: 119 MMHG | TEMPERATURE: 98 F | BODY MASS INDEX: 32.2 KG/M2 | HEIGHT: 62 IN

## 2017-12-29 DIAGNOSIS — I10 ESSENTIAL HYPERTENSION: Primary | ICD-10-CM

## 2017-12-29 DIAGNOSIS — N18.30 CKD (CHRONIC KIDNEY DISEASE) STAGE 3, GFR 30-59 ML/MIN (H): ICD-10-CM

## 2017-12-29 PROCEDURE — 99213 OFFICE O/P EST LOW 20 MIN: CPT | Performed by: PHYSICIAN ASSISTANT

## 2017-12-29 NOTE — PATIENT INSTRUCTIONS
Prefer you to try lisinopril again instead   If not feeling well, let me know and I'll switch meds  If BP not staying under 130/80, we can increase lisinopril to 40 mg.  Currently written as 20 mg.    Call or My Chart to update me on BPs, OR free RN visit     Discussed tired kidneys again

## 2017-12-29 NOTE — MR AVS SNAPSHOT
After Visit Summary   12/29/2017    Hilda Claire    MRN: [id number]           Patient Information     Date Of Birth          1955        Visit Information        Provider Department      12/29/2017 1:00 PM Brionna Gonzalez PA-C [facility]        Care Instructions    Prefer you to try lisinopril again instead   If not feeling well, let me know and I'll switch meds  If BP not staying under 130/80, we can increase lisinopril to 40 mg.  Currently written as 20 mg.    Call or My Chart to update me on BPs, OR free RN visit     Discussed tired kidneys again            Follow-ups after your visit        Who to contact     If you have questions or need follow up information about today's clinic visit or your schedule please contact [facility] directly at [phone].  Normal or non-critical lab and imaging results will be communicated to you by [employer], letter or phone within 4 business days after the clinic has received the results. If you do not hear from us within 7 days, please contact the clinic through [employer] or phone. If you have a critical or abnormal lab result, we will notify you by phone as soon as possible.  Submit refill requests through [employer] or call your pharmacy and they will forward the refill request to us. Please allow 3 business days for your refill to be completed.          Additional Information About Your Visit        MyChart Information     [employer] gives you secure access to your electronic health record. If you see a primary care provider, you can also send messages to your care team and make appointments. If you have questions, please call your primary care clinic.  If you do not have a primary care provider, please call [phone] and they will assist you.        Care EveryWhere ID     This is your Care EveryWhere ID. This could be used by other organizations to access your [place] medical records  [id number]        Your  "Vitals Were     Pulse Temperature Height Last Period BMI (Body Mass Index)       65 98  F (36.7  C) (Tympanic) 5' 2\" (1.575 m) 06/02/2008 32.01 kg/m2        Blood Pressure from Last 3 Encounters:   12/29/17 119/77   11/17/17 132/86   11/11/16 128/76    Weight from Last 3 Encounters:   12/29/17 175 lb (79.4 kg)   11/17/17 180 lb (81.6 kg)   11/11/16 182 lb 9.6 oz (82.8 kg)              Today, you had the following     No orders found for display         Today's Medication Changes          These changes are accurate as of: 12/29/17  1:42 PM.  If you have any questions, ask your nurse or doctor.               These medicines have changed or have updated prescriptions.        Dose/Directions    TIROSINT 50 MCG Caps   This may have changed:  Another medication with the same name was removed. Continue taking this medication, and follow the directions you see here.   Used for:  Hypothyroidism due to Hashimoto's thyroiditis   Generic drug:  Levothyroxine Sodium   Changed by:  Brionna Gonzalez PA-C        1 tab daily.  This a lower dose than my medical recommendation but what patient is willing to take.  Advising to see endocrinology.   Quantity:  90 capsule   Refills:  3         Stop taking these medicines if you haven't already. Please contact your care team if you have questions.     amLODIPine 5 MG tablet   Commonly known as:  NORVASC   Stopped by:  Brionna Gonzalez PA-C                    Primary Care Provider Office Phone # Fax #    Brionna Gonzalez PA-C [phone] [address] [place] [address]        Equal Access to Services     [facility] AH: Hadii onel costa Socandace, waaxda luqadaha, qaybta kaalmada loida, juan holloway. So [facility] [phone].    ATENCIÓN: Si habla español, tiene a barry disposición servicios gratuitos de asistencia lingüística. Llame al [phone].    We comply with applicable federal civil rights laws and [place] " laws. We do not discriminate on the basis of race, color, national origin, age, disability, sex, sexual orientation, or gender identity.            Thank you!     Thank you for choosing [facility]  for your care. Our goal is always to provide you with excellent care. Hearing back from our patients is one way we can continue to improve our services. Please take a few minutes to complete the written survey that you may receive in the mail after your visit with us. Thank you!             Your Updated Medication List - Protect others around you: Learn how to safely use, store and throw away your medicines at www.disposemymeds.org.          This list is accurate as of: 12/29/17  1:42 PM.  Always use your most recent med list.                   Brand Name Dispense Instructions for use Diagnosis    aspirin  MG EC tablet      Take 1 tablet (325 mg) by mouth daily For stroke prevention.    Cerebrovascular accident (CVA) due to other mechanism (H)       atorvastatin 80 MG tablet    LIPITOR    90 tablet    Take 1 tablet (80 mg) by mouth daily    Dyslipidemia       lisinopril 20 MG tablet    PRINIVIL/ZESTRIL    90 tablet    Take 1 tablet (20 mg) by mouth daily    CKD (chronic kidney disease) stage 3, GFR 30-59 ml/min, Essential hypertension with goal blood pressure less than 140/90       loperamide 2 MG capsule    IMODIUM     Take 2 mg by mouth 4 times daily as needed for diarrhea        metoprolol 50 MG tablet    LOPRESSOR    60 tablet    Take 0.5 tablet in am and 0.5 tablets in pm    Essential hypertension with goal blood pressure less than 140/90       TIROSINT 50 MCG Caps   Generic drug:  Levothyroxine Sodium     90 capsule    1 tab daily.  This a lower dose than my medical recommendation but what patient is willing to take.  Advising to see endocrinology.    Hypothyroidism due to Hashimoto's thyroiditis

## 2017-12-29 NOTE — PROGRESS NOTES
"  SUBJECTIVE:   Hilda Claire is a 62 year old female who presents to clinic today for the following health issues:      Hypertension Follow-up      Outpatient blood pressures are being checked at home.  Results are 143/94 on 12/22, 160/80 12/28, 155/79 while being on amlodipine for the past week.      Low Salt Diet: no added salt        Amount of exercise or physical activity: 4-5 days/week for an average of 30-45 minutes    Problems taking medications regularly: No    Medication side effects: none    Diet: regular (no restrictions)    * Ran out of Lisinopril about a week ago.  Feels her BP was worse on lisinopril than when she was on amlodipine.  Restarted amlodipine when she ran out of Lisinopril.    CKD3.  Still doesn't understand.    Problem list and histories reviewed & adjusted, as indicated.  Additional history: as documented    BP Readings from Last 3 Encounters:   12/29/17 119/77   11/17/17 132/86   11/11/16 128/76    Wt Readings from Last 3 Encounters:   12/29/17 175 lb (79.4 kg)   11/17/17 180 lb (81.6 kg)   11/11/16 182 lb 9.6 oz (82.8 kg)        Labs reviewed in EPIC      Reviewed and updated as needed this visit by clinical staffTobacco  Allergies  Meds  Problems  Med Hx  Surg Hx  Fam Hx  Soc Hx        Reviewed and updated as needed this visit by Provider  Tobacco  Allergies  Meds  Problems  Med Hx  Surg Hx  Fam Hx  Soc Hx          ROS:  Constitutional, cardiovascular, pulmonary, systems are negative, except as otherwise noted.    OBJECTIVE:   /77 (BP Location: Right arm, Patient Position: Chair, Cuff Size: Adult Large)  Pulse 65  Temp 98  F (36.7  C) (Tympanic)  Ht 5' 2\" (1.575 m)  Wt 175 lb (79.4 kg)  LMP 06/02/2008  BMI 32.01 kg/m2  Body mass index is 32.01 kg/(m^2).  GENERAL: healthy, alert and no distress  CV: regular rate and rhythm, normal S1 S2, no S3 or S4, no murmur, click or rub    ASSESSMENT/PLAN:       ICD-10-CM    1. Essential hypertension I10    2. CKD " (chronic kidney disease) stage 3, GFR 30-59 ml/min N18.3      She again declines flu vaccine and already has mammogram contact info  Patient Instructions   Prefer you to try lisinopril again instead   If not feeling well, let me know and I'll switch meds  If BP not staying under 130/80, we can increase lisinopril to 40 mg.  Currently written as 20 mg.    Call or My Chart to update me on BPs, OR free RN visit     Discussed tired kidneys again        Brionna Gonzalez PA-C  [facility]

## 2018-01-15 ENCOUNTER — MYC MEDICAL ADVICE (OUTPATIENT)
Dept: FAMILY MEDICINE | Facility: CLINIC | Age: 63
End: 2018-01-15

## 2018-01-15 DIAGNOSIS — I10 ESSENTIAL HYPERTENSION WITH GOAL BLOOD PRESSURE LESS THAN 140/90: ICD-10-CM

## 2018-01-15 DIAGNOSIS — N18.30 CKD (CHRONIC KIDNEY DISEASE) STAGE 3, GFR 30-59 ML/MIN (H): ICD-10-CM

## 2018-01-17 RX ORDER — LISINOPRIL 20 MG/1
40 TABLET ORAL DAILY
Qty: 60 TABLET | Refills: 3 | COMMUNITY
Start: 2018-01-17 | End: 2018-01-31 | Stop reason: ALTCHOICE

## 2018-01-17 NOTE — TELEPHONE ENCOUNTER
Thanks for updating me.  Try increase to lisinopril 40 mg - take 2 tabs of the 20 mg - then update me again please.

## 2018-01-30 ENCOUNTER — MYC MEDICAL ADVICE (OUTPATIENT)
Dept: FAMILY MEDICINE | Facility: CLINIC | Age: 63
End: 2018-01-30

## 2018-01-30 DIAGNOSIS — I10 ESSENTIAL HYPERTENSION WITH GOAL BLOOD PRESSURE LESS THAN 140/90: ICD-10-CM

## 2018-01-31 RX ORDER — AMLODIPINE BESYLATE 5 MG/1
5 TABLET ORAL DAILY
Qty: 90 TABLET | Refills: 3 | Status: SHIPPED | OUTPATIENT
Start: 2018-01-31 | End: 2018-11-16

## 2018-01-31 NOTE — TELEPHONE ENCOUNTER
Hilda says she would prefer to go back to the Amlodipine. Please send to [facility] pharmacy.

## 2018-04-03 DIAGNOSIS — I10 ESSENTIAL HYPERTENSION WITH GOAL BLOOD PRESSURE LESS THAN 140/90: ICD-10-CM

## 2018-04-03 RX ORDER — METOPROLOL TARTRATE 50 MG
TABLET ORAL
Qty: 90 TABLET | Refills: 2 | Status: SHIPPED | OUTPATIENT
Start: 2018-04-03 | End: 2018-08-15

## 2018-04-03 NOTE — TELEPHONE ENCOUNTER
"Requested Prescriptions   Pending Prescriptions Disp Refills     metoprolol tartrate (LOPRESSOR) 50 MG tablet [Pharmacy Med Name: METOPROLOL TARTRATE 50MG TABS] 60 tablet 0     Sig: TAKE 1/2 TABLET BY MOUTH IN THE MORNING, AND 1/2 TABLET IN THE EVENING.    Beta-Blockers Protocol Passed    4/3/2018 12:26 PM       Passed - Blood pressure under 140/90 in past 12 months    BP Readings from Last 3 Encounters:   12/29/17 119/77   11/17/17 132/86   11/11/16 128/76                Passed - Patient is age 6 or older       Passed - Recent (12 mo) or future (30 days) visit within the authorizing provider's specialty    Patient had office visit in the last 12 months or has a visit in the next 30 days with authorizing provider or within the authorizing provider's specialty.  See \"Patient Info\" tab in inbasket, or \"Choose Columns\" in Meds & Orders section of the refill encounter.            Last Written Prescription Date:  11/17/2017  Last Fill Quantity: 60,  # refills: 0   Last office visit: 12/29/2017 with prescribing provider:  Carlos   Future Office Visit:      "

## 2018-04-03 NOTE — TELEPHONE ENCOUNTER
Prescription for metoprolol tartrate approved per [facility] Refill Protocol.    BP Readings from Last 3 Encounters:   12/29/17 119/77   11/17/17 132/86   11/11/16 128/76       Sydni MCDONALD RN

## 2018-05-02 ENCOUNTER — TELEPHONE (OUTPATIENT)
Dept: FAMILY MEDICINE | Facility: CLINIC | Age: 63
End: 2018-05-02

## 2018-05-02 NOTE — TELEPHONE ENCOUNTER
Prior Authorization Retail Medication Request    Medication/Dose: tirosint 50  ICD code (if different than what is on RX):  -  Previously Tried and Failed:  Synthroid - Levothyroxine    Insurance Name:  [employer]  Insurance ID:  [id number]      Pharmacy Information (if different than what is on RX)  Name:  -  Phone:  -      Thank You!  Dayna Miller  [facility]  P: [phone] F:[phone]

## 2018-05-03 NOTE — TELEPHONE ENCOUNTER
Central Prior Authorization Team   Phone: [phone]    PA Initiation    Medication: tirosint 50  Insurance Company: [employer] - Phone [phone] Fax [phone]  Pharmacy Filling the Rx: [place], [place] - [address] [place] [address]  Filling Pharmacy Phone: [phone]  Filling Pharmacy Fax:    Start Date: 5/3/2018

## 2018-05-03 NOTE — TELEPHONE ENCOUNTER
Prior Authorization Approval    Authorization Effective Date: 4/3/2018  Authorization Expiration Date: 5/4/2019  Medication: tirosint 50  Approved Dose/Quantity:    Reference #:     Insurance Company: [employer] - Phone [phone] Fax [phone]  Expected CoPay:       CoPay Card Available:      Foundation Assistance Needed:    Which Pharmacy is filling the prescription (Not needed for infusion/clinic administered): [facility] [place], [place] - [address] [place] [address]  Pharmacy Notified: Yes  Patient Notified: Yes

## 2018-06-18 ENCOUNTER — MYC MEDICAL ADVICE (OUTPATIENT)
Dept: FAMILY MEDICINE | Facility: CLINIC | Age: 63
End: 2018-06-18

## 2018-06-21 ENCOUNTER — MYC MEDICAL ADVICE (OUTPATIENT)
Dept: FAMILY MEDICINE | Facility: CLINIC | Age: 63
End: 2018-06-21

## 2018-08-13 ENCOUNTER — MYC MEDICAL ADVICE (OUTPATIENT)
Dept: FAMILY MEDICINE | Facility: CLINIC | Age: 63
End: 2018-08-13

## 2018-08-13 DIAGNOSIS — I10 ESSENTIAL HYPERTENSION WITH GOAL BLOOD PRESSURE LESS THAN 140/90: ICD-10-CM

## 2018-08-15 RX ORDER — METOPROLOL TARTRATE 50 MG
50 TABLET ORAL 2 TIMES DAILY
Qty: 180 TABLET | Refills: 1 | Status: SHIPPED | OUTPATIENT
Start: 2018-08-15 | End: 2018-11-16

## 2018-10-17 DIAGNOSIS — E06.3 HYPOTHYROIDISM DUE TO HASHIMOTO'S THYROIDITIS: ICD-10-CM

## 2018-10-17 RX ORDER — LEVOTHYROXINE SODIUM 50 UG/1
50 TABLET ORAL DAILY
Qty: 30 TABLET | Refills: 0 | Status: SHIPPED | OUTPATIENT
Start: 2018-10-17 | End: 2018-11-16

## 2018-10-17 RX ORDER — LEVOTHYROXINE SODIUM 50 UG/1
CAPSULE ORAL
Qty: 90 CAPSULE | Refills: 3 | Status: CANCELLED | OUTPATIENT
Start: 2018-10-17

## 2018-10-17 NOTE — TELEPHONE ENCOUNTER
"Requested Prescriptions   Pending Prescriptions Disp Refills     TIROSINT 50 MCG CAPS [Pharmacy Med Name: TIROSINT 50MCG CAPS] 90 capsule 3     Sig: TAKE ONE CAPSULE BY MOUTH EVERY DAY    Thyroid Protocol Failed    10/17/2018 11:49 AM       Failed - Normal TSH on file in past 12 months    Recent Labs   Lab Test  10/17/17   1615   TSH  13.90*             Passed - Patient is 12 years or older       Passed - Recent (12 mo) or future (30 days) visit within the authorizing provider's specialty    Patient had office visit in the last 12 months or has a visit in the next 30 days with authorizing provider or within the authorizing provider's specialty.  See \"Patient Info\" tab in inbasket, or \"Choose Columns\" in Meds & Orders section of the refill encounter.             Passed - No active pregnancy on record    If patient is pregnant or has had a positive pregnancy test, please check TSH.         Passed - No positive pregnancy test in past 12 months    If patient is pregnant or has had a positive pregnancy test, please check TSH.          Last Written Prescription Date:  11/17/17  Last Fill Quantity: 90,  # refills: 3   Last office visit: 12/29/2017 with prescribing provider:  WHIT   Future Office Visit:      "

## 2018-10-17 NOTE — TELEPHONE ENCOUNTER
Routing refill request to provider for review/approval because:  Labs out of range:  See below    Dominga Oliveira RN

## 2018-11-13 ASSESSMENT — ENCOUNTER SYMPTOMS
ABDOMINAL PAIN: 0
CONSTIPATION: 0
HEMATURIA: 0
SHORTNESS OF BREATH: 0
WEAKNESS: 0
FREQUENCY: 0
DIZZINESS: 0
ARTHRALGIAS: 0
HEMATOCHEZIA: 0
SORE THROAT: 0
COUGH: 0
MYALGIAS: 0
HEARTBURN: 0
DIARRHEA: 0
NAUSEA: 0
DYSURIA: 0
HEADACHES: 0
FEVER: 0
EYE PAIN: 0
PALPITATIONS: 0
JOINT SWELLING: 0
BREAST MASS: 0
NERVOUS/ANXIOUS: 0
PARESTHESIAS: 0
CHILLS: 0

## 2018-11-16 ENCOUNTER — OFFICE VISIT (OUTPATIENT)
Dept: FAMILY MEDICINE | Facility: CLINIC | Age: 63
End: 2018-11-16
Payer: COMMERCIAL

## 2018-11-16 ENCOUNTER — TELEPHONE (OUTPATIENT)
Dept: FAMILY MEDICINE | Facility: CLINIC | Age: 63
End: 2018-11-16

## 2018-11-16 VITALS
WEIGHT: 175.2 LBS | HEART RATE: 64 BPM | DIASTOLIC BLOOD PRESSURE: 72 MMHG | RESPIRATION RATE: 16 BRPM | BODY MASS INDEX: 32.24 KG/M2 | SYSTOLIC BLOOD PRESSURE: 121 MMHG | TEMPERATURE: 98.2 F | HEIGHT: 62 IN

## 2018-11-16 DIAGNOSIS — Z00.00 ROUTINE HISTORY AND PHYSICAL EXAMINATION OF ADULT: Primary | ICD-10-CM

## 2018-11-16 DIAGNOSIS — E78.5 DYSLIPIDEMIA: ICD-10-CM

## 2018-11-16 DIAGNOSIS — N18.30 CKD (CHRONIC KIDNEY DISEASE) STAGE 3, GFR 30-59 ML/MIN (H): ICD-10-CM

## 2018-11-16 DIAGNOSIS — Z86.73 HISTORY OF STROKE: ICD-10-CM

## 2018-11-16 DIAGNOSIS — Z11.4 ENCOUNTER FOR SCREENING FOR HIV: ICD-10-CM

## 2018-11-16 DIAGNOSIS — Z12.11 SPECIAL SCREENING FOR MALIGNANT NEOPLASMS, COLON: ICD-10-CM

## 2018-11-16 DIAGNOSIS — E03.9 ACQUIRED HYPOTHYROIDISM: ICD-10-CM

## 2018-11-16 DIAGNOSIS — Z12.31 ENCOUNTER FOR SCREENING MAMMOGRAM FOR BREAST CANCER: ICD-10-CM

## 2018-11-16 DIAGNOSIS — I10 ESSENTIAL HYPERTENSION: ICD-10-CM

## 2018-11-16 DIAGNOSIS — Z23 ENCOUNTER FOR IMMUNIZATION: ICD-10-CM

## 2018-11-16 DIAGNOSIS — R73.03 PREDIABETES: ICD-10-CM

## 2018-11-16 LAB
ANION GAP SERPL CALCULATED.3IONS-SCNC: 3 MMOL/L (ref 3–14)
BUN SERPL-MCNC: 18 MG/DL (ref 7–30)
CALCIUM SERPL-MCNC: 9.4 MG/DL (ref 8.5–10.1)
CHLORIDE SERPL-SCNC: 103 MMOL/L (ref 94–109)
CHOLEST SERPL-MCNC: 168 MG/DL
CO2 SERPL-SCNC: 33 MMOL/L (ref 20–32)
CREAT SERPL-MCNC: 1.02 MG/DL (ref 0.52–1.04)
CREAT UR-MCNC: 31 MG/DL
GFR SERPL CREATININE-BSD FRML MDRD: 55 ML/MIN/1.7M2
GLUCOSE SERPL-MCNC: 95 MG/DL (ref 70–99)
HDLC SERPL-MCNC: 59 MG/DL
HGB BLD-MCNC: 13.6 G/DL (ref 11.7–15.7)
LDLC SERPL CALC-MCNC: 68 MG/DL
MICROALBUMIN UR-MCNC: <5 MG/L
MICROALBUMIN/CREAT UR: NORMAL MG/G CR (ref 0–25)
NONHDLC SERPL-MCNC: 109 MG/DL
POTASSIUM SERPL-SCNC: 4.3 MMOL/L (ref 3.4–5.3)
SODIUM SERPL-SCNC: 139 MMOL/L (ref 133–144)
T4 FREE SERPL-MCNC: 0.79 NG/DL (ref 0.76–1.46)
TRIGL SERPL-MCNC: 206 MG/DL
TSH SERPL DL<=0.005 MIU/L-ACNC: 8.71 MU/L (ref 0.4–4)

## 2018-11-16 PROCEDURE — 99396 PREV VISIT EST AGE 40-64: CPT | Mod: 25 | Performed by: PHYSICIAN ASSISTANT

## 2018-11-16 PROCEDURE — 84439 ASSAY OF FREE THYROXINE: CPT | Performed by: PHYSICIAN ASSISTANT

## 2018-11-16 PROCEDURE — 80061 LIPID PANEL: CPT | Performed by: PHYSICIAN ASSISTANT

## 2018-11-16 PROCEDURE — 80048 BASIC METABOLIC PNL TOTAL CA: CPT | Performed by: PHYSICIAN ASSISTANT

## 2018-11-16 PROCEDURE — 90471 IMMUNIZATION ADMIN: CPT | Performed by: PHYSICIAN ASSISTANT

## 2018-11-16 PROCEDURE — 90714 TD VACC NO PRESV 7 YRS+ IM: CPT | Performed by: PHYSICIAN ASSISTANT

## 2018-11-16 PROCEDURE — 82043 UR ALBUMIN QUANTITATIVE: CPT | Performed by: PHYSICIAN ASSISTANT

## 2018-11-16 PROCEDURE — 36415 COLL VENOUS BLD VENIPUNCTURE: CPT | Performed by: PHYSICIAN ASSISTANT

## 2018-11-16 PROCEDURE — 85018 HEMOGLOBIN: CPT | Performed by: PHYSICIAN ASSISTANT

## 2018-11-16 PROCEDURE — 87389 HIV-1 AG W/HIV-1&-2 AB AG IA: CPT | Performed by: PHYSICIAN ASSISTANT

## 2018-11-16 PROCEDURE — 84443 ASSAY THYROID STIM HORMONE: CPT | Performed by: PHYSICIAN ASSISTANT

## 2018-11-16 RX ORDER — LEVOTHYROXINE SODIUM 50 UG/1
50 TABLET ORAL DAILY
Qty: 90 TABLET | Refills: 3 | Status: SHIPPED | OUTPATIENT
Start: 2018-11-16 | End: 2019-09-30

## 2018-11-16 RX ORDER — AMLODIPINE BESYLATE 5 MG/1
5 TABLET ORAL DAILY
Qty: 90 TABLET | Refills: 3 | Status: SHIPPED | OUTPATIENT
Start: 2018-11-16 | End: 2019-11-22

## 2018-11-16 RX ORDER — METOPROLOL TARTRATE 50 MG
50 TABLET ORAL 2 TIMES DAILY
Qty: 180 TABLET | Refills: 3 | Status: SHIPPED | OUTPATIENT
Start: 2018-11-16 | End: 2019-11-22

## 2018-11-16 RX ORDER — ATORVASTATIN CALCIUM 80 MG/1
80 TABLET, FILM COATED ORAL DAILY
Qty: 90 TABLET | Refills: 3 | Status: SHIPPED | OUTPATIENT
Start: 2018-11-16 | End: 2019-11-22

## 2018-11-16 ASSESSMENT — ENCOUNTER SYMPTOMS
HEMATOCHEZIA: 0
DYSURIA: 0
HEMATURIA: 0
EYE PAIN: 0
WEAKNESS: 0
SHORTNESS OF BREATH: 0
FEVER: 0
HEADACHES: 0
COUGH: 0
PALPITATIONS: 0
NERVOUS/ANXIOUS: 0
PARESTHESIAS: 0
DIARRHEA: 0
JOINT SWELLING: 0
DIZZINESS: 0
HEARTBURN: 0
NAUSEA: 0
MYALGIAS: 0
ARTHRALGIAS: 0
CHILLS: 0
CONSTIPATION: 0
FREQUENCY: 0
BREAST MASS: 0
ABDOMINAL PAIN: 0
SORE THROAT: 0

## 2018-11-16 NOTE — NURSING NOTE
"Chief Complaint   Patient presents with     Physical       Initial /72 (BP Location: Right arm, Patient Position: Chair, Cuff Size: Adult Large)  Pulse 64  Temp 98.2  F (36.8  C) (Tympanic)  Resp 16  Ht 5' 2\" (1.575 m)  Wt 175 lb 3.2 oz (79.5 kg)  LMP 06/02/2008  BMI 32.04 kg/m2 Estimated body mass index is 32.04 kg/(m^2) as calculated from the following:    Height as of this encounter: 5' 2\" (1.575 m).    Weight as of this encounter: 175 lb 3.2 oz (79.5 kg).    Patient presents to the clinic using No DME    Health Maintenance that is potentially due pending provider review:  NONE        Is there anyone who you would like to be able to receive your results? No  If yes have patient fill out REMEDIOS      "

## 2018-11-16 NOTE — PROGRESS NOTES
SUBJECTIVE:   CC: Hilda Claire is an 63 year old woman who presents for preventive health visit.     Physical   Annual:     Getting at least 3 servings of Calcium per day:  Yes    Bi-annual eye exam:  Yes    Dental care twice a year:  Yes    Sleep apnea or symptoms of sleep apnea:  None    Diet:  Low salt    Frequency of exercise:  4-5 days/week    Duration of exercise:  30-45 minutes    Taking medications regularly:  Yes    Medication side effects:  Other    Additional concerns today:  No      * Will update Td  Still declines flu and pneumonia shots.  Declines shingrix.    CKD3.  HTN.  BPs 110s usually/50s-low 70s.  Pulses 58-70.  No chest pains, chest pressures, SOB or sudden change of endurance.   No TIA symptoms.  Hypothyroid.  Still DEREK and prefers to not be on med at all.    Meaning to set up mammo.  Reluctant about FIT.    Likes bowling.  Won 2 turkeys this yr.  Still working as [employer] worker.    Today's PHQ-2 Score:   PHQ-2 ( 1999 [employer]) 11/13/2018   Q1: Little interest or pleasure in doing things 0   Q2: Feeling down, depressed or hopeless 0   PHQ-2 Score 0   Q1: Little interest or pleasure in doing things Not at all   Q2: Feeling down, depressed or hopeless Not at all   PHQ-2 Score 0       Abuse: Current or Past(Physical, Sexual or Emotional)- No  Do you feel safe in your environment - Yes    Social History   Substance Use Topics     Smoking status: Never Smoker     Smokeless tobacco: Never Used     Alcohol use Yes      Comment: minimal     Alcohol Use 11/13/2018   If you drink alcohol do you typically have greater than 3 drinks per day OR greater than 7 drinks per week? No       Reviewed orders with patient.  Reviewed health maintenance and updated orders accordingly - Yes  Labs reviewed in EPIC  BP Readings from Last 3 Encounters:   11/16/18 121/72   12/29/17 119/77   11/17/17 132/86    Wt Readings from Last 3 Encounters:   11/16/18 175 lb 3.2 oz (79.5 kg)   12/29/17 175 lb (79.4 kg)  "  11/17/17 180 lb (81.6 kg)         Patient over age 50, mutual decision to screen reflected in health maintenance.    Pertinent mammograms are reviewed under the imaging tab.  History of abnormal Pap smear: NO - age 30- 65 PAP every 3 years recommended  PAP / HPV Latest Ref Rng & Units 11/11/2016 6/23/2008 8/17/2007   PAP - NIL ASC-US(A) OTHER-NIL EM>40   HPV 16 DNA NEG Negative - -   HPV 18 DNA NEG Negative - -   OTHER HR HPV NEG Negative - -     Reviewed and updated as needed this visit by clinical staff  Tobacco  Allergies  Meds  Problems  Med Hx  Surg Hx  Fam Hx  Soc Hx          Reviewed and updated as needed this visit by Provider  Tobacco  Problems  Med Hx  Surg Hx  Fam Hx  Soc Hx         Review of Systems   Constitutional: Negative for chills and fever.   HENT: Negative for congestion, ear pain, hearing loss and sore throat.    Eyes: Negative for pain and visual disturbance.   Respiratory: Negative for cough and shortness of breath.    Cardiovascular: Negative for chest pain, palpitations and peripheral edema.   Gastrointestinal: Negative for abdominal pain, constipation, diarrhea, heartburn, hematochezia and nausea.   Breasts:  Negative for tenderness, breast mass and discharge.   Genitourinary: Negative for dysuria, frequency, genital sores, hematuria, pelvic pain, urgency, vaginal bleeding and vaginal discharge.   Musculoskeletal: Negative for arthralgias, joint swelling and myalgias.   Skin: Negative for rash.   Neurological: Negative for dizziness, weakness, headaches and paresthesias.   Psychiatric/Behavioral: Negative for mood changes. The patient is not nervous/anxious.         OBJECTIVE:   /72 (BP Location: Right arm, Patient Position: Chair, Cuff Size: Adult Large)  Pulse 64  Temp 98.2  F (36.8  C) (Tympanic)  Resp 16  Ht 5' 2\" (1.575 m)  Wt 175 lb 3.2 oz (79.5 kg)  LMP 06/02/2008  BMI 32.04 kg/m2  Physical Exam  GENERAL APPEARANCE: healthy, alert and no distress  EYES: " Eyes grossly normal to inspection, PERRL and conjunctivae and sclerae normal  HENT: ear canals and TM's normal, nose and mouth without ulcers or lesions, oropharynx clear and oral mucous membranes moist  NECK: no adenopathy, no asymmetry, masses, or scars and thyroid normal to palpation  RESP: lungs clear to auscultation - no rales, rhonchi or wheezes  BREAST: normal without masses, tenderness or nipple discharge and no palpable axillary masses or adenopathy  CV: regular rate and rhythm, normal S1 S2, no S3 or S4, no murmur, click or rub, no peripheral edema and peripheral pulses strong  ABDOMEN: soft, nontender, no hepatosplenomegaly, no masses and bowel sounds normal  MS: no musculoskeletal defects are noted and gait is age appropriate without ataxia  SKIN: no suspicious lesions or rashes  NEURO: Normal strength and tone, sensory exam grossly normal, mentation intact and speech normal  PSYCH: mentation appears normal and affect normal/bright    Diagnostic Test Results:  none     ASSESSMENT/PLAN:       ICD-10-CM    1. Routine history and physical examination of adult Z00.00    2. CKD (chronic kidney disease) stage 3, GFR 30-59 ml/min (H) N18.3 Basic metabolic panel  (Ca, Cl, CO2, Creat, Gluc, K, Na, BUN)     Hemoglobin     Albumin Random Urine Quantitative with Creat Ratio   3. Prediabetes R73.03    4. Acquired hypothyroidism E03.9 TSH with free T4 reflex     levothyroxine (SYNTHROID/LEVOTHROID) 50 MCG tablet   5. Essential hypertension I10 amLODIPine (NORVASC) 5 MG tablet     metoprolol tartrate (LOPRESSOR) 50 MG tablet     Basic metabolic panel  (Ca, Cl, CO2, Creat, Gluc, K, Na, BUN)   6. History of stroke Z86.73 Lipid panel reflex to direct LDL Non-fasting   7. Dyslipidemia E78.5 Lipid panel reflex to direct LDL Non-fasting     atorvastatin (LIPITOR) 80 MG tablet   8. Special screening for malignant neoplasms, colon Z12.11 Fecal colorectal cancer screen (FIT)   9. Encounter for screening mammogram for breast  "cancer Z12.31 *MA Screening Digital Bilateral     Lipid panel reflex to direct LDL Non-fasting   10. Encounter for immunization Z23 TD PRESERV FREE, IM (7+ YRS)     ADMIN 1st VACCINE   11. Encounter for screening for HIV Z11.4 HIV Antigen Antibody Combo       COUNSELING:  Reviewed preventive health counseling, as reflected in patient instructions       Regular exercise       Healthy diet/nutrition       Colon cancer screening    BP Readings from Last 1 Encounters:   11/16/18 121/72     Estimated body mass index is 32.04 kg/(m^2) as calculated from the following:    Height as of this encounter: 5' 2\" (1.575 m).    Weight as of this encounter: 175 lb 3.2 oz (79.5 kg).      Weight management plan: Discussed healthy diet and exercise guidelines and patient will follow up in 12 months in clinic to re-evaluate.     reports that she has never smoked. She has never used smokeless tobacco.    Counseling Resources:  ATP IV Guidelines  Pooled Cohorts Equation Calculator  Breast Cancer Risk Calculator  FRAX Risk Assessment  ICSI Preventive Guidelines  Dietary Guidelines for Americans, 2010  [employer]'s MyPlate  ASA Prophylaxis  Lung CA Screening    Brionna Gonzalez PA-C  [facility]      Patient Instructions   Labs today     Do the mail the poop test and mammogram - mammo info below    Recommended flu shot and pneumonia shot    Keep working on weight loss      [facility] Mammo Schedule  [facility] ~ [phone]  One Day Weekly- Alternating Days    [place] ~ [phone]  Every other Monday or Wednesday   & one Saturday morning a month    [place] ~ [phone]  Every Other Monday Afternoon    [place] ~ [phone]  Every Other Monday Morning    [place] ~ [phone]  Every Monday morning  Every Tuesday afternoon       Wed, Thurs, Friday morning & afternoon        "

## 2018-11-16 NOTE — PATIENT INSTRUCTIONS
Labs today     Do the mail the poop test and mammogram - mammo info below    Recommended flu shot and pneumonia shot    Keep working on weight loss      [facility] Mammo Schedule  [facility] ~ [phone]  One Day Weekly- Alternating Days    [place] ~ [phone]  Every other Monday or Wednesday   & one Saturday morning a month    [place] ~ [phone]  Every Other Monday Afternoon    [place] ~ [phone]  Every Other Monday Morning    [place] ~ [phone]  Every Monday morning  Every Tuesday afternoon       Wed, Thurs, Friday morning & afternoon

## 2018-11-16 NOTE — MR AVS SNAPSHOT
After Visit Summary   11/16/2018    Hilda Claire    MRN: [id number]           Patient Information     Date Of Birth          1955        Visit Information        Provider Department      11/16/2018 8:40 AM Brionna Gonzalez PA-C [facility]        Today's Diagnoses     Routine history and physical examination of adult    -  1    CKD (chronic kidney disease) stage 3, GFR 30-59 ml/min (H)        Prediabetes        Acquired hypothyroidism        Essential hypertension        History of stroke        Dyslipidemia        Special screening for malignant neoplasms, colon        Encounter for screening mammogram for breast cancer        Encounter for immunization        Encounter for screening for HIV          Care Instructions    Labs today     Do the mail the poop test and mammogram - mammo info below    Recommended flu shot and pneumonia shot    Keep working on weight loss      [facility] Mammo Schedule  [facility] ~ [phone]  One Day Weekly- Alternating Days    [place] ~ [phone]  Every other Monday or Wednesday   & one Saturday morning a month    [place] ~ [phone]  Every Other Monday Afternoon    [place] ~ [phone]  Every Other Monday Morning    [place] ~ [phone]  Every Monday morning  Every Tuesday afternoon       Wed, Thurs, Friday morning & afternoon            Follow-ups after your visit        Future tests that were ordered for you today     Open Future Orders        Priority Expected Expires Ordered    *MA Screening Digital Bilateral Routine  11/16/2019 11/16/2018    Fecal colorectal cancer screen (FIT) Routine 12/7/2018 2/8/2019 11/16/2018            Who to contact     If you have questions or need follow up information about today's clinic visit or your schedule please contact [facility] directly at [phone].  Normal or non-critical lab and imaging results will be communicated to you by MyChart, letter or phone  "within 4 business days after the clinic has received the results. If you do not hear from us within 7 days, please contact the clinic through musiXmatch or phone. If you have a critical or abnormal lab result, we will notify you by phone as soon as possible.  Submit refill requests through musiXmatch or call your pharmacy and they will forward the refill request to us. Please allow 3 business days for your refill to be completed.          Additional Information About Your Visit        [employer] Information     [employer]match gives you secure access to your electronic health record. If you see a primary care provider, you can also send messages to your care team and make appointments. If you have questions, please call your primary care clinic.  If you do not have a primary care provider, please call [phone] and they will assist you.        Care EveryWhere ID     This is your Care EveryWhere ID. This could be used by other organizations to access your [facility] medical records  [id number]        Your Vitals Were     Pulse Temperature Respirations Height Last Period BMI (Body Mass Index)    64 98.2  F (36.8  C) (Tympanic) 16 5' 2\" (1.575 m) 06/02/2008 32.04 kg/m2       Blood Pressure from Last 3 Encounters:   11/16/18 121/72   12/29/17 119/77   11/17/17 132/86    Weight from Last 3 Encounters:   11/16/18 175 lb 3.2 oz (79.5 kg)   12/29/17 175 lb (79.4 kg)   11/17/17 180 lb (81.6 kg)              We Performed the Following     ADMIN 1st VACCINE     Albumin Random Urine Quantitative with Creat Ratio     Basic metabolic panel  (Ca, Cl, CO2, Creat, Gluc, K, Na, BUN)     Hemoglobin     HIV Antigen Antibody Combo     Lipid panel reflex to direct LDL Non-fasting     TD PRESERV FREE, IM (7+ YRS)     TSH with free T4 reflex          Where to get your medicines      These medications were sent to [facility] [place] [id number] [place] [address]     Phone:  [phone]     " amLODIPine 5 MG tablet    atorvastatin 80 MG tablet    levothyroxine 50 MCG tablet    metoprolol tartrate 50 MG tablet          Primary Care Provider Office Phone # Fax #    Brionna Gonzalez PA-C [phone] [address] [place] [address]        Equal Access to Services     GISELLE ALMEIDA : Hadii aad ku hadasho Soomaali, waaxda luqadaha, qaybta kaalmada adeegyada, waxay luchoin hayilirn adecorry sahu joyce holloway. So [facility] [phone].    ATENCIÓN: Si habla español, tiene a barry disposición servicios gratuitos de asistencia lingüística. Llame al [phone].    We comply with applicable federal civil rights laws and [place] laws. We do not discriminate on the basis of race, color, national origin, age, disability, sex, sexual orientation, or gender identity.            Thank you!     Thank you for choosing [facility]  for your care. Our goal is always to provide you with excellent care. Hearing back from our patients is one way we can continue to improve our services. Please take a few minutes to complete the written survey that you may receive in the mail after your visit with us. Thank you!             Your Updated Medication List - Protect others around you: Learn how to safely use, store and throw away your medicines at www.disposemymeds.org.          This list is accurate as of 11/16/18  9:35 AM.  Always use your most recent med list.                   Brand Name Dispense Instructions for use Diagnosis    amLODIPine 5 MG tablet    NORVASC    90 tablet    Take 1 tablet (5 mg) by mouth daily Please monitor BP and let Brionna know if BPs high.    Essential hypertension       aspirin 325 MG EC tablet      Take 1 tablet (325 mg) by mouth daily For stroke prevention.    Cerebrovascular accident (CVA) due to other mechanism       atorvastatin 80 MG tablet    LIPITOR    90 tablet    Take 1 tablet (80 mg) by mouth daily    Dyslipidemia       levothyroxine 50 MCG tablet     SYNTHROID/LEVOTHROID    90 tablet    Take 1 tablet (50 mcg) by mouth daily    Acquired hypothyroidism       loperamide 2 MG capsule    IMODIUM     Take 2 mg by mouth 4 times daily as needed for diarrhea        metoprolol tartrate 50 MG tablet    LOPRESSOR    180 tablet    Take 1 tablet (50 mg) by mouth 2 times daily    Essential hypertension

## 2018-11-16 NOTE — TELEPHONE ENCOUNTER
Patient's TSH is high, but is better than it has in the past. Brionna is wondering if patient would be interested in increasing her Levothyroxine.    Edie BRANCH MA

## 2018-11-19 LAB — HIV 1+2 AB+HIV1 P24 AG SERPL QL IA: NONREACTIVE

## 2018-11-19 NOTE — PROGRESS NOTES
Cristina Stevens,    As discussed last week, thyroid lab is shows need for dose increase, but no increase per your decision.   Other labs all are fine.     Please contact me if you have questions.    Brionna Gonzalez PA-C

## 2018-11-26 PROBLEM — E78.5 DYSLIPIDEMIA: Status: ACTIVE | Noted: 2018-11-26

## 2018-12-28 ENCOUNTER — TELEPHONE (OUTPATIENT)
Dept: FAMILY MEDICINE | Facility: CLINIC | Age: 63
End: 2018-12-28

## 2018-12-28 NOTE — TELEPHONE ENCOUNTER
Panel Management Review      Patient has the following on her problem list: None      Composite cancer screening  Chart review shows that this patient is due/due soon for the following Mammogram  Summary:    Patient is due/failing the following:   MAMMOGRAM    Action needed:   Patient needs office visit for mammo      Type of outreach:    Phone, left message for patient to call back.     Questions for provider review:    None                                                                                                                                    Milli Diaz MA

## 2019-03-05 NOTE — PROGRESS NOTES
Cristina Stevens,    Your results were all normal.  Kidney function improved slightly (can vary depending on how much water you've been drinking).  I've sent refills of lisinopril.    Please call clinic at [phone] if you have questions.    Brionna Gonzalez PA-C   What Type Of Note Output Would You Prefer (Optional)?: Standard Output How Severe Is Your Acne?: moderate Is This A New Presentation, Or A Follow-Up?: Acne

## 2019-03-28 PROCEDURE — 82274 ASSAY TEST FOR BLOOD FECAL: CPT | Performed by: PHYSICIAN ASSISTANT

## 2019-04-01 DIAGNOSIS — Z12.11 SPECIAL SCREENING FOR MALIGNANT NEOPLASMS, COLON: ICD-10-CM

## 2019-04-01 LAB — HEMOCCULT STL QL IA: NEGATIVE

## 2019-04-03 NOTE — RESULT ENCOUNTER NOTE
Cristina Stevens,    Colon cancer screening test (FIT) for blood in stool was normal.  Repeat yearly unless new symptoms occur.      Please contact me if you have questions.    Brionna Gonzalez PA-C

## 2019-07-22 ENCOUNTER — TELEPHONE (OUTPATIENT)
Dept: FAMILY MEDICINE | Facility: CLINIC | Age: 64
End: 2019-07-22

## 2019-07-31 ENCOUNTER — TELEPHONE (OUTPATIENT)
Dept: FAMILY MEDICINE | Facility: CLINIC | Age: 64
End: 2019-07-31

## 2019-07-31 NOTE — TELEPHONE ENCOUNTER
Central Prior Authorization Team   Phone: [phone]    PA Initiation    Medication: Tirosint 50mcg  Insurance Company: [employer] - Phone [phone] Fax [phone]  Pharmacy Filling the Rx: [place], [place] - [address] [place] [address]  Filling Pharmacy Phone: [phone]  Filling Pharmacy Fax:    Start Date: 7/31/2019

## 2019-07-31 NOTE — TELEPHONE ENCOUNTER
Prior Authorization Retail Medication Request    Medication/Dose: Tirosint 50mcg  ICD code (if different than what is on RX):Hypothyroidism due to Hashimoto's thyroiditis [E03.8, E06.3]   -  Previously Tried and Failed:  -synthroid, levothyroxine--not as effective  Rationale:  -pt stable on this rx    Insurance Name:   commercial  Insurance ID:  [id number] ([phone])      Pharmacy Information (if different than what is on RX)  Name:  [facility]  Phone:  [phone]    Thank you  Laurel Vasquez CPht    [facility] [place]  Phone: [phone]  Fax: [phone]

## 2019-07-31 NOTE — TELEPHONE ENCOUNTER
Central Prior Authorization Team   Phone: [phone]    Prior Authorization Approval    Authorization Effective Date: 7/1/2019  Authorization Expiration Date: 7/31/2020  Medication: Tirosint 50mcg  Approved Dose/Quantity:   Reference #: [id number]   Insurance Company: [employer] - Phone [phone] Fax [phone]  Expected CoPay:       CoPay Card Available:      Foundation Assistance Needed:    Which Pharmacy is filling the prescription (Not needed for infusion/clinic administered): [facility] [place], [place] - [address] [place] [address]  Pharmacy Notified: Yes  Patient Notified: Yes  **Instructed pharmacy to notify patient when script is ready to /ship.**

## 2019-11-03 ENCOUNTER — HEALTH MAINTENANCE LETTER (OUTPATIENT)
Age: 64
End: 2019-11-03

## 2019-11-15 ENCOUNTER — TELEPHONE (OUTPATIENT)
Dept: FAMILY MEDICINE | Facility: CLINIC | Age: 64
End: 2019-11-15

## 2019-11-15 NOTE — TELEPHONE ENCOUNTER
Panel Management Review      Patient has the following on her problem list: None      Composite cancer screening  Chart review shows that this patient is due/due soon for the following Mammogram  Summary:    Patient is due/failing the following:   MAMMOGRAM    Action needed:   Patient needs office visit for mammo.    Type of outreach:    Phone, left message for patient to call back.     Questions for provider review:    None                                                                                                                                    Milli Diaz MA

## 2019-11-19 ASSESSMENT — ENCOUNTER SYMPTOMS
FEVER: 0
HEARTBURN: 0
DYSURIA: 0
HEMATOCHEZIA: 0
WEAKNESS: 0
FREQUENCY: 0
PARESTHESIAS: 0
BREAST MASS: 0
HEADACHES: 0
MYALGIAS: 0
DIARRHEA: 0
CONSTIPATION: 0
ABDOMINAL PAIN: 0
CHILLS: 0
HEMATURIA: 0
JOINT SWELLING: 0
DIZZINESS: 0
SHORTNESS OF BREATH: 0
NAUSEA: 0
COUGH: 0
PALPITATIONS: 0
ARTHRALGIAS: 0
NERVOUS/ANXIOUS: 0
EYE PAIN: 0
SORE THROAT: 0

## 2019-11-22 ENCOUNTER — OFFICE VISIT (OUTPATIENT)
Dept: FAMILY MEDICINE | Facility: CLINIC | Age: 64
End: 2019-11-22
Payer: COMMERCIAL

## 2019-11-22 VITALS
WEIGHT: 176 LBS | BODY MASS INDEX: 32.39 KG/M2 | DIASTOLIC BLOOD PRESSURE: 78 MMHG | HEART RATE: 57 BPM | OXYGEN SATURATION: 100 % | SYSTOLIC BLOOD PRESSURE: 132 MMHG | HEIGHT: 62 IN | RESPIRATION RATE: 12 BRPM

## 2019-11-22 DIAGNOSIS — I63.30 CEREBROVASCULAR ACCIDENT (CVA) DUE TO THROMBOSIS OF CEREBRAL ARTERY (H): ICD-10-CM

## 2019-11-22 DIAGNOSIS — I63.9 ACUTE ISCHEMIC STROKE (H): ICD-10-CM

## 2019-11-22 DIAGNOSIS — Z00.00 ROUTINE GENERAL MEDICAL EXAMINATION AT A HEALTH CARE FACILITY: Primary | ICD-10-CM

## 2019-11-22 DIAGNOSIS — Z12.31 ENCOUNTER FOR SCREENING MAMMOGRAM FOR BREAST CANCER: ICD-10-CM

## 2019-11-22 DIAGNOSIS — E03.9 ACQUIRED HYPOTHYROIDISM: ICD-10-CM

## 2019-11-22 DIAGNOSIS — R73.03 PREDIABETES: ICD-10-CM

## 2019-11-22 DIAGNOSIS — I10 ESSENTIAL HYPERTENSION: ICD-10-CM

## 2019-11-22 DIAGNOSIS — N18.30 CKD (CHRONIC KIDNEY DISEASE) STAGE 3, GFR 30-59 ML/MIN (H): ICD-10-CM

## 2019-11-22 DIAGNOSIS — E78.5 DYSLIPIDEMIA: ICD-10-CM

## 2019-11-22 LAB
CHOLEST SERPL-MCNC: 167 MG/DL
CREAT SERPL-MCNC: 1.05 MG/DL (ref 0.52–1.04)
CREAT UR-MCNC: 44 MG/DL
GFR SERPL CREATININE-BSD FRML MDRD: 57 ML/MIN/{1.73_M2}
HDLC SERPL-MCNC: 61 MG/DL
LDLC SERPL CALC-MCNC: 66 MG/DL
MICROALBUMIN UR-MCNC: <5 MG/L
MICROALBUMIN/CREAT UR: NORMAL MG/G CR (ref 0–25)
NONHDLC SERPL-MCNC: 106 MG/DL
T4 FREE SERPL-MCNC: 0.86 NG/DL (ref 0.76–1.46)
TRIGL SERPL-MCNC: 200 MG/DL
TSH SERPL DL<=0.005 MIU/L-ACNC: 8.01 MU/L (ref 0.4–4)

## 2019-11-22 PROCEDURE — 82043 UR ALBUMIN QUANTITATIVE: CPT | Performed by: PHYSICIAN ASSISTANT

## 2019-11-22 PROCEDURE — 36415 COLL VENOUS BLD VENIPUNCTURE: CPT | Performed by: PHYSICIAN ASSISTANT

## 2019-11-22 PROCEDURE — 80061 LIPID PANEL: CPT | Performed by: PHYSICIAN ASSISTANT

## 2019-11-22 PROCEDURE — 84443 ASSAY THYROID STIM HORMONE: CPT | Performed by: PHYSICIAN ASSISTANT

## 2019-11-22 PROCEDURE — 82565 ASSAY OF CREATININE: CPT | Performed by: PHYSICIAN ASSISTANT

## 2019-11-22 PROCEDURE — 84439 ASSAY OF FREE THYROXINE: CPT | Performed by: PHYSICIAN ASSISTANT

## 2019-11-22 PROCEDURE — 99396 PREV VISIT EST AGE 40-64: CPT | Mod: 25 | Performed by: PHYSICIAN ASSISTANT

## 2019-11-22 RX ORDER — AMLODIPINE BESYLATE 5 MG/1
5 TABLET ORAL DAILY
Qty: 90 TABLET | Refills: 3 | Status: SHIPPED | OUTPATIENT
Start: 2019-11-22 | End: 2020-11-17

## 2019-11-22 RX ORDER — ATORVASTATIN CALCIUM 80 MG/1
80 TABLET, FILM COATED ORAL DAILY
Qty: 90 TABLET | Refills: 3 | Status: SHIPPED | OUTPATIENT
Start: 2019-11-22 | End: 2019-12-27 | Stop reason: SINTOL

## 2019-11-22 RX ORDER — LEVOTHYROXINE SODIUM 50 UG/1
CAPSULE ORAL
Qty: 90 CAPSULE | Refills: 3 | Status: SHIPPED | OUTPATIENT
Start: 2019-11-22 | End: 2020-11-17

## 2019-11-22 RX ORDER — METOPROLOL TARTRATE 50 MG
50 TABLET ORAL 2 TIMES DAILY
Qty: 180 TABLET | Refills: 3 | Status: SHIPPED | OUTPATIENT
Start: 2019-11-22 | End: 2020-11-17

## 2019-11-22 ASSESSMENT — ENCOUNTER SYMPTOMS
CONSTIPATION: 0
BREAST MASS: 0
WEAKNESS: 0
PALPITATIONS: 0
HEMATURIA: 0
DIZZINESS: 0
MYALGIAS: 0
NAUSEA: 0
CHILLS: 0
ARTHRALGIAS: 0
SHORTNESS OF BREATH: 0
PARESTHESIAS: 0
SORE THROAT: 0
EYE PAIN: 0
JOINT SWELLING: 0
HEADACHES: 0
FREQUENCY: 0
HEMATOCHEZIA: 0
DYSURIA: 0
NERVOUS/ANXIOUS: 0
ABDOMINAL PAIN: 0
HEARTBURN: 0
DIARRHEA: 0
COUGH: 0
FEVER: 0

## 2019-11-22 ASSESSMENT — MIFFLIN-ST. JEOR: SCORE: 1311.58

## 2019-11-22 NOTE — PATIENT INSTRUCTIONS
Labs today    Way overdue on mammo    Decided not to increase BP med.  Let me know if change mind.  [place] goal under 130/80, need to change for sure if getting 140/90.    Discussed options for lips, decided to do nothing more at this time      [facility] M[facility] Schedule  [place] ~ [phone]  One Day Weekly- Alternating Days    [place] ~ [phone]  Every other Monday or Wednesday   & one Saturday morning a month    [place] ~ [phone]  Every Other Monday Afternoon    [place] ~ [phone]  Every Other Monday Morning    [place] ~ [phone]  Every Monday morning  Every Tuesday afternoon  Wed, Thurs, Friday morning & afternoon        Preventive Health Recommendations  Female Ages 50 - 64    Yearly exam: See your health care provider every year in order to  o Review health changes.   o Discuss preventive care.    o Review your medicines if your doctor has prescribed any.      Get a Pap test every three years (unless you have an abnormal result and your provider advises testing more often).    If you get Pap tests with HPV test, you only need to test every 5 years, unless you have an abnormal result.     You do not need a Pap test if your uterus was removed (hysterectomy) and you have not had cancer.    You should be tested each year for STDs (sexually transmitted diseases) if you're at risk.     Have a mammogram every 1 to 2 years.    Have a colonoscopy at age 50, or have a yearly FIT test (stool test). These exams screen for colon cancer.      Have a cholesterol test every 5 years, or more often if advised.    Have a diabetes test (fasting glucose) every three years. If you are at risk for diabetes, you should have this test more often.     If you are at risk for osteoporosis (brittle bone disease), think about having a bone density scan (DEXA).    Shots: Get a flu shot each year. Get a tetanus shot every 10 years.    Nutrition:     Eat at least 5 servings of fruits and vegetables each  day.    Eat whole-grain bread, whole-wheat pasta and brown rice instead of white grains and rice.    Get adequate Calcium and Vitamin D.     Lifestyle    Exercise at least 150 minutes a week (30 minutes a day, 5 days a week). This will help you control your weight and prevent disease.    Limit alcohol to one drink per day.    No smoking.     Wear sunscreen to prevent skin cancer.     See your dentist every six months for an exam and cleaning.    See your eye doctor every 1 to 2 years.

## 2019-11-22 NOTE — PROGRESS NOTES
SUBJECTIVE:   CC: Hilda Claire is an 64 year old woman who presents for preventive health visit.     Healthy Habits:     Getting at least 3 servings of Calcium per day:  Yes    Bi-annual eye exam:  Yes    Dental care twice a year:  Yes    Sleep apnea or symptoms of sleep apnea:  None    Diet:  Regular (no restrictions)    Frequency of exercise:  4-5 days/week    Duration of exercise:  30-45 minutes    Taking medications regularly:  Yes    PHQ-2 Total Score: 0    Additional concerns today:  No    HTN and history stroke- 130s/70s, pulse low 60s.  No chest pains, chest pressures, SOB or sudden change of endurance.       Thyroid - again not having any problems, not wanting med adjustment no matter what.    Atorvastatin - no myalgia.    CKD3.    Declines flu and pneumovax.    Today's PHQ-2 Score:   PHQ-2 ( 1999 Pfizer) 11/19/2019   Q1: Little interest or pleasure in doing things 0   Q2: Feeling down, depressed or hopeless 0   PHQ-2 Score 0   Q1: Little interest or pleasure in doing things Not at all   Q2: Feeling down, depressed or hopeless Not at all   PHQ-2 Score 0       Abuse: Current or Past(Physical, Sexual or Emotional)- No  Do you feel safe in your environment? Yes    Have you ever done Advance Care Planning? (For example, a Health Directive, POLST, or a discussion with a medical provider or your loved ones about your wishes): No, advance care planning information given to patient to review.  Advanced care planning was discussed at today's visit.    Social History     Tobacco Use     Smoking status: Never Smoker     Smokeless tobacco: Never Used   Substance Use Topics     Alcohol use: Yes     Comment: minimal       Alcohol Use 11/19/2019   Prescreen: >3 drinks/day or >7 drinks/week? No   Prescreen: >3 drinks/day or >7 drinks/week? -       Reviewed orders with patient.  Reviewed health maintenance and updated orders accordingly - Yes  Labs reviewed in EPIC  BP Readings from Last 3 Encounters:   11/22/19  132/78   11/16/18 121/72   12/29/17 119/77    Wt Readings from Last 3 Encounters:   11/22/19 79.8 kg (176 lb)   11/16/18 79.5 kg (175 lb 3.2 oz)   12/29/17 79.4 kg (175 lb)         Mammogram Screening: Patient over age 50, mutual decision to screen reflected in health maintenance.    Pertinent mammograms are reviewed under the imaging tab.  History of abnormal Pap smear: NO - age 30-65 PAP every 5 years with negative HPV co-testing recommended  PAP / HPV Latest Ref Rng & Units 11/11/2016 6/23/2008 8/17/2007   PAP - NIL ASC-US(A) OTHER-NIL EM>40   HPV 16 DNA NEG Negative - -   HPV 18 DNA NEG Negative - -   OTHER HR HPV NEG Negative - -     Reviewed and updated as needed this visit by clinical staff  Tobacco  Allergies  Meds  Problems  Med Hx  Surg Hx  Fam Hx  Soc Hx          Reviewed and updated as needed this visit by Provider  Tobacco  Allergies  Meds  Problems  Med Hx  Surg Hx  Fam Hx          Review of Systems   Constitutional: Negative for chills and fever.   HENT: Negative for congestion, ear pain, hearing loss and sore throat.    Eyes: Negative for pain and visual disturbance.   Respiratory: Negative for cough and shortness of breath.    Cardiovascular: Negative for chest pain, palpitations and peripheral edema.   Gastrointestinal: Negative for abdominal pain, constipation, diarrhea, heartburn, hematochezia and nausea.   Breasts:  Negative for tenderness, breast mass and discharge.   Genitourinary: Negative for dysuria, frequency, genital sores, hematuria, pelvic pain, urgency, vaginal bleeding and vaginal discharge.   Musculoskeletal: Negative for arthralgias, joint swelling and myalgias.   Skin: Negative for rash.   Neurological: Negative for dizziness, weakness, headaches and paresthesias.   Psychiatric/Behavioral: Negative for mood changes. The patient is not nervous/anxious.      OBJECTIVE:   /78 (BP Location: Right arm, Patient Position: Chair, Cuff Size: Adult Large)   Pulse 57    "Resp 12   Ht 1.575 m (5' 2\")   Wt 79.8 kg (176 lb)   LMP 06/02/2008   SpO2 100%   BMI 32.19 kg/m    Physical Exam  GENERAL APPEARANCE: healthy, alert and no distress  EYES: Eyes grossly normal to inspection, PERRL and conjunctivae and sclerae normal  HENT: ear canals and TM's normal, nose and mouth without ulcers or lesions, oropharynx clear and oral mucous membranes moist  NECK: no adenopathy, no asymmetry, masses, or scars and thyroid normal to palpation  RESP: lungs clear to auscultation - no rales, rhonchi or wheezes  BREAST: normal without masses, tenderness or nipple discharge and no palpable axillary masses or adenopathy  CV: regular rate and rhythm, normal S1 S2, no S3 or S4, no murmur, click or rub, no peripheral edema and peripheral pulses strong  ABDOMEN: soft, nontender, no hepatosplenomegaly, no masses and bowel sounds normal  MS: no musculoskeletal defects are noted and gait is age appropriate without ataxia  SKIN: no suspicious lesions or rashes  NEURO: Normal strength and tone, sensory exam grossly normal, mentation intact and speech normal  PSYCH: mentation appears normal and affect normal/bright    Diagnostic Test Results:  Labs reviewed in Epic    ASSESSMENT/PLAN:       ICD-10-CM    1. Routine general medical examination at a health care facility Z00.00 *MA Screening Digital Bilateral   2. Essential hypertension I10 amLODIPine (NORVASC) 5 MG tablet     metoprolol tartrate (LOPRESSOR) 50 MG tablet   3. Dyslipidemia E78.5 Lipid panel reflex to direct LDL Fasting     atorvastatin (LIPITOR) 80 MG tablet   4. CKD (chronic kidney disease) stage 3, GFR 30-59 ml/min (H) N18.3 Albumin Random Urine Quantitative with Creat Ratio     Lipid panel reflex to direct LDL Fasting     Creatinine   5. Prediabetes R73.03    6. Acquired hypothyroidism E03.9 TSH with free T4 reflex     levothyroxine (TIROSINT) 50 MCG capsule   7. Encounter for screening mammogram for breast cancer Z12.31    8. Cerebrovascular " "accident (CVA) due to thrombosis of cerebral artery (H) I63.30    9. Acute ischemic stroke (H) I63.9        COUNSELING:  Reviewed preventive health counseling, as reflected in patient instructions       Regular exercise       Healthy diet/nutrition    Estimated body mass index is 32.19 kg/m  as calculated from the following:    Height as of this encounter: 1.575 m (5' 2\").    Weight as of this encounter: 79.8 kg (176 lb).    Weight management plan: Discussed healthy diet and exercise guidelines     reports that she has never smoked. She has never used smokeless tobacco.    Counseling Resources:  ATP IV Guidelines  Pooled Cohorts Equation Calculator  Breast Cancer Risk Calculator  FRAX Risk Assessment  ICSI Preventive Guidelines  Dietary Guidelines for Americans, 2010  [employer] MyPlate  ASA Prophylaxis  Lung CA Screening    Brionna Gonzalez PA-C  [facility]      Patient Instructions   Labs today    Way overdue on mammo    Decided not to increase BP med.  Let me know if change mind.  [place] goal under 130/80, need to change for sure if getting 140/90.    Discussed options for lips, decided to do nothing more at this time      [facility] Mammo Schedule  [facility] ~ [phone]  One Day Weekly- Alternating Days    [place] ~ [phone]  Every other Monday or Wednesday   & one Saturday morning a month    [place] ~ [phone]  Every Other Monday Afternoon    [place] ~ [phone]  Every Other Monday Morning    [place] ~ [phone]  Every Monday morning  Every Tuesday afternoon  Wed, Thurs, Friday morning & afternoon        Preventive Health Recommendations  Female Ages 50 - 64    Yearly exam: See your health care provider every year in order to  o Review health changes.   o Discuss preventive care.    o Review your medicines if your doctor has prescribed any.      Get a Pap test every three years (unless you have an abnormal result and your provider advises testing more " often).    If you get Pap tests with HPV test, you only need to test every 5 years, unless you have an abnormal result.     You do not need a Pap test if your uterus was removed (hysterectomy) and you have not had cancer.    You should be tested each year for STDs (sexually transmitted diseases) if you're at risk.     Have a mammogram every 1 to 2 years.    Have a colonoscopy at age 50, or have a yearly FIT test (stool test). These exams screen for colon cancer.      Have a cholesterol test every 5 years, or more often if advised.    Have a diabetes test (fasting glucose) every three years. If you are at risk for diabetes, you should have this test more often.     If you are at risk for osteoporosis (brittle bone disease), think about having a bone density scan (DEXA).    Shots: Get a flu shot each year. Get a tetanus shot every 10 years.    Nutrition:     Eat at least 5 servings of fruits and vegetables each day.    Eat whole-grain bread, whole-wheat pasta and brown rice instead of white grains and rice.    Get adequate Calcium and Vitamin D.     Lifestyle    Exercise at least 150 minutes a week (30 minutes a day, 5 days a week). This will help you control your weight and prevent disease.    Limit alcohol to one drink per day.    No smoking.     Wear sunscreen to prevent skin cancer.     See your dentist every six months for an exam and cleaning.    See your eye doctor every 1 to 2 years.

## 2019-11-22 NOTE — NURSING NOTE
"Chief Complaint   Patient presents with     Physical       Initial /78 (BP Location: Right arm, Patient Position: Chair, Cuff Size: Adult Large)   Pulse 57   Resp 12   Ht 1.575 m (5' 2\")   Wt 79.8 kg (176 lb)   LMP 06/02/2008   SpO2 100%   BMI 32.19 kg/m   Estimated body mass index is 32.19 kg/m  as calculated from the following:    Height as of this encounter: 1.575 m (5' 2\").    Weight as of this encounter: 79.8 kg (176 lb).    Patient presents to the clinic using No DME    Health Maintenance that is potentially due pending provider review:  Mammogram    Gave pt phone number/pended order to schedule mammo and/or colonoscopy(or FIT)    Is there anyone who you would like to be able to receive your results? No  If yes have patient fill out REMEDIOS      "

## 2019-11-25 NOTE — RESULT ENCOUNTER NOTE
Cristina Stevens,    Labs all look fine.  Thyroid level is similar to last year, so I'll still defer to you on your preference to keep current thyroid dose.    Please contact me if you have questions.    Brionna

## 2019-12-19 ENCOUNTER — MYC MEDICAL ADVICE (OUTPATIENT)
Dept: FAMILY MEDICINE | Facility: CLINIC | Age: 64
End: 2019-12-19

## 2019-12-19 DIAGNOSIS — E78.5 DYSLIPIDEMIA: ICD-10-CM

## 2019-12-19 DIAGNOSIS — Z86.73 HISTORY OF ISCHEMIC STROKE WITHOUT RESIDUAL DEFICITS: Primary | ICD-10-CM

## 2019-12-27 RX ORDER — ROSUVASTATIN CALCIUM 20 MG/1
20 TABLET, COATED ORAL DAILY
Qty: 90 TABLET | Refills: 3 | Status: SHIPPED | OUTPATIENT
Start: 2019-12-27 | End: 2020-11-17

## 2020-02-10 ENCOUNTER — HEALTH MAINTENANCE LETTER (OUTPATIENT)
Age: 65
End: 2020-02-10

## 2020-11-11 ASSESSMENT — ENCOUNTER SYMPTOMS
CHILLS: 0
EYE PAIN: 0
WEAKNESS: 0
HEMATOCHEZIA: 0
DIZZINESS: 0
FEVER: 0
HEADACHES: 0
NERVOUS/ANXIOUS: 0
HEARTBURN: 0
PARESTHESIAS: 0
CONSTIPATION: 0
PALPITATIONS: 0
JOINT SWELLING: 0
DYSURIA: 0
NAUSEA: 0
ARTHRALGIAS: 0
SORE THROAT: 0
DIARRHEA: 0
COUGH: 0
MYALGIAS: 0
ABDOMINAL PAIN: 0
BREAST MASS: 0
SHORTNESS OF BREATH: 0
FREQUENCY: 0
HEMATURIA: 0

## 2020-11-12 ENCOUNTER — OFFICE VISIT (OUTPATIENT)
Dept: FAMILY MEDICINE | Facility: CLINIC | Age: 65
End: 2020-11-12
Payer: COMMERCIAL

## 2020-11-12 VITALS
BODY MASS INDEX: 33.31 KG/M2 | HEART RATE: 74 BPM | DIASTOLIC BLOOD PRESSURE: 80 MMHG | SYSTOLIC BLOOD PRESSURE: 126 MMHG | WEIGHT: 181 LBS | RESPIRATION RATE: 12 BRPM | HEIGHT: 62 IN | OXYGEN SATURATION: 100 % | TEMPERATURE: 98.3 F

## 2020-11-12 DIAGNOSIS — Z12.11 SCREENING FOR COLON CANCER: ICD-10-CM

## 2020-11-12 DIAGNOSIS — Z12.31 VISIT FOR SCREENING MAMMOGRAM: ICD-10-CM

## 2020-11-12 DIAGNOSIS — I10 ESSENTIAL HYPERTENSION: ICD-10-CM

## 2020-11-12 DIAGNOSIS — E78.5 DYSLIPIDEMIA: ICD-10-CM

## 2020-11-12 DIAGNOSIS — Z00.00 ROUTINE GENERAL MEDICAL EXAMINATION AT A HEALTH CARE FACILITY: Primary | ICD-10-CM

## 2020-11-12 DIAGNOSIS — Z86.73 HISTORY OF ISCHEMIC STROKE WITHOUT RESIDUAL DEFICITS: ICD-10-CM

## 2020-11-12 DIAGNOSIS — R73.03 PREDIABETES: ICD-10-CM

## 2020-11-12 DIAGNOSIS — Z13.220 LIPID SCREENING: ICD-10-CM

## 2020-11-12 DIAGNOSIS — E03.9 ACQUIRED HYPOTHYROIDISM: ICD-10-CM

## 2020-11-12 DIAGNOSIS — N18.30 STAGE 3 CHRONIC KIDNEY DISEASE, UNSPECIFIED WHETHER STAGE 3A OR 3B CKD (H): ICD-10-CM

## 2020-11-12 DIAGNOSIS — Z13.1 SCREENING FOR DIABETES MELLITUS: ICD-10-CM

## 2020-11-12 DIAGNOSIS — Z12.4 SCREENING FOR MALIGNANT NEOPLASM OF CERVIX: ICD-10-CM

## 2020-11-12 LAB
ANION GAP SERPL CALCULATED.3IONS-SCNC: 4 MMOL/L (ref 3–14)
BUN SERPL-MCNC: 15 MG/DL (ref 7–30)
CALCIUM SERPL-MCNC: 9.4 MG/DL (ref 8.5–10.1)
CHLORIDE SERPL-SCNC: 106 MMOL/L (ref 94–109)
CHOLEST SERPL-MCNC: 153 MG/DL
CO2 SERPL-SCNC: 29 MMOL/L (ref 20–32)
CREAT SERPL-MCNC: 0.96 MG/DL (ref 0.52–1.04)
CREAT UR-MCNC: 28 MG/DL
GFR SERPL CREATININE-BSD FRML MDRD: 63 ML/MIN/{1.73_M2}
GLUCOSE SERPL-MCNC: 120 MG/DL (ref 70–99)
HBA1C MFR BLD: 6 % (ref 0–5.6)
HDLC SERPL-MCNC: 67 MG/DL
HGB BLD-MCNC: 13.6 G/DL (ref 11.7–15.7)
LDLC SERPL CALC-MCNC: 24 MG/DL
MICROALBUMIN UR-MCNC: <5 MG/L
MICROALBUMIN/CREAT UR: NORMAL MG/G CR (ref 0–25)
NONHDLC SERPL-MCNC: 86 MG/DL
POTASSIUM SERPL-SCNC: 3.6 MMOL/L (ref 3.4–5.3)
SODIUM SERPL-SCNC: 139 MMOL/L (ref 133–144)
T4 FREE SERPL-MCNC: 0.9 NG/DL (ref 0.76–1.46)
TRIGL SERPL-MCNC: 311 MG/DL
TSH SERPL DL<=0.005 MIU/L-ACNC: 13.74 MU/L (ref 0.4–4)

## 2020-11-12 PROCEDURE — 87624 HPV HI-RISK TYP POOLED RSLT: CPT | Performed by: PHYSICIAN ASSISTANT

## 2020-11-12 PROCEDURE — 80048 BASIC METABOLIC PNL TOTAL CA: CPT | Performed by: PHYSICIAN ASSISTANT

## 2020-11-12 PROCEDURE — 83036 HEMOGLOBIN GLYCOSYLATED A1C: CPT | Performed by: PHYSICIAN ASSISTANT

## 2020-11-12 PROCEDURE — 99214 OFFICE O/P EST MOD 30 MIN: CPT | Mod: 25 | Performed by: PHYSICIAN ASSISTANT

## 2020-11-12 PROCEDURE — 36415 COLL VENOUS BLD VENIPUNCTURE: CPT | Performed by: PHYSICIAN ASSISTANT

## 2020-11-12 PROCEDURE — 82043 UR ALBUMIN QUANTITATIVE: CPT | Performed by: PHYSICIAN ASSISTANT

## 2020-11-12 PROCEDURE — 85018 HEMOGLOBIN: CPT | Performed by: PHYSICIAN ASSISTANT

## 2020-11-12 PROCEDURE — G0145 SCR C/V CYTO,THINLAYER,RESCR: HCPCS | Performed by: PHYSICIAN ASSISTANT

## 2020-11-12 PROCEDURE — 84443 ASSAY THYROID STIM HORMONE: CPT | Performed by: PHYSICIAN ASSISTANT

## 2020-11-12 PROCEDURE — 84439 ASSAY OF FREE THYROXINE: CPT | Performed by: PHYSICIAN ASSISTANT

## 2020-11-12 PROCEDURE — 99396 PREV VISIT EST AGE 40-64: CPT | Performed by: PHYSICIAN ASSISTANT

## 2020-11-12 PROCEDURE — 80061 LIPID PANEL: CPT | Performed by: PHYSICIAN ASSISTANT

## 2020-11-12 ASSESSMENT — ENCOUNTER SYMPTOMS
CHILLS: 0
NERVOUS/ANXIOUS: 0
HEMATOCHEZIA: 0
SORE THROAT: 0
BREAST MASS: 0
ARTHRALGIAS: 0
HEMATURIA: 0
DYSURIA: 0
FEVER: 0
PARESTHESIAS: 0
ABDOMINAL PAIN: 0
DIARRHEA: 0
JOINT SWELLING: 0
DIZZINESS: 0
HEARTBURN: 0
MYALGIAS: 0
WEAKNESS: 0
NAUSEA: 0
COUGH: 0
CONSTIPATION: 0
EYE PAIN: 0
PALPITATIONS: 0
HEADACHES: 0
FREQUENCY: 0
SHORTNESS OF BREATH: 0

## 2020-11-12 ASSESSMENT — MIFFLIN-ST. JEOR: SCORE: 1329.26

## 2020-11-12 NOTE — PROGRESS NOTES
SUBJECTIVE:   CC: Hilda Claire is an 65 year old woman who presents for preventive health visit.     Patient has been advised of split billing requirements and indicates understanding: Yes  Healthy Habits:     Getting at least 3 servings of Calcium per day:  Yes    Bi-annual eye exam:  Yes    Dental care twice a year:  Yes    Sleep apnea or symptoms of sleep apnea:  None    Diet:  Low salt    Frequency of exercise:  4-5 days/week    Duration of exercise:  30-45 minutes    Taking medications regularly:  Yes    Medication side effects:  No significant flushing    PHQ-2 Total Score: 0    Additional concerns today:  No     now with prostate cancer, thinks treatable.    Thyroid - feels fine, doesn't see any reason to change doses.    HTN - does self monitor -128-135/78.    CKD3.  Dyslipidemia, on crestor 20.  History stroke, no residual.    Prediabetes.    Up 5 pounds.  Work out every morning.  Walks as much as able.  Plays with dogs, does gardening.      Plans to retire age 67.  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    Today's PHQ-2 Score:   PHQ-2 ( 1999 Pfizer) 11/11/2020   Q1: Little interest or pleasure in doing things 0   Q2: Feeling down, depressed or hopeless 0   PHQ-2 Score 0   Q1: Little interest or pleasure in doing things Not at all   Q2: Feeling down, depressed or hopeless Not at all   PHQ-2 Score 0     Abuse: Current or Past (Physical, Sexual or Emotional) - No  Do you feel safe in your environment? Yes    Social History     Tobacco Use     Smoking status: Never Smoker     Smokeless tobacco: Never Used   Substance Use Topics     Alcohol use: Yes     Comment: minimal       Alcohol Use 11/11/2020   Prescreen: >3 drinks/day or >7 drinks/week? No   Prescreen: >3 drinks/day or >7 drinks/week? -     Reviewed orders with patient.  Reviewed health maintenance and updated orders accordingly - Yes  Labs reviewed in EPIC  BP Readings from Last 3 Encounters:   11/12/20  "126/80   11/22/19 132/78   11/16/18 121/72    Wt Readings from Last 3 Encounters:   11/12/20 82.1 kg (181 lb)   11/22/19 79.8 kg (176 lb)   11/16/18 79.5 kg (175 lb 3.2 oz)         Mammogram Screening: Patient over age 50, mutual decision to screen reflected in health maintenance.    Pertinent mammograms are reviewed under the imaging tab.  History of abnormal Pap smear: NO - age 30-65 PAP every 5 years with negative HPV co-testing recommended  PAP / HPV Latest Ref Rng & Units 11/12/2020 11/11/2016 6/23/2008   PAP - NIL NIL ASC-US(A)   HPV 16 DNA NEG - Negative -   HPV 18 DNA NEG - Negative -   OTHER HR HPV NEG - Negative -     Reviewed and updated as needed this visit by clinical staff  Tobacco  Allergies  Meds   Med Hx  Surg Hx  Fam Hx  Soc Hx        Reviewed and updated as needed this visit by Provider                  Review of Systems   Constitutional: Negative for chills and fever.   HENT: Negative for congestion, ear pain, hearing loss and sore throat.    Eyes: Negative for pain and visual disturbance.   Respiratory: Negative for cough and shortness of breath.    Cardiovascular: Negative for chest pain, palpitations and peripheral edema.   Gastrointestinal: Negative for abdominal pain, constipation, diarrhea, heartburn, hematochezia and nausea.   Breasts:  Negative for tenderness, breast mass and discharge.   Genitourinary: Negative for dysuria, frequency, genital sores, hematuria, pelvic pain, urgency, vaginal bleeding and vaginal discharge.   Musculoskeletal: Negative for arthralgias, joint swelling and myalgias.   Skin: Negative for rash.   Neurological: Negative for dizziness, weakness, headaches and paresthesias.   Psychiatric/Behavioral: Negative for mood changes. The patient is not nervous/anxious.      OBJECTIVE:   /80 (BP Location: Right arm, Patient Position: Chair, Cuff Size: Adult Large)   Pulse 74   Temp 98.3  F (36.8  C) (Tympanic)   Resp 12   Ht 1.575 m (5' 2\")   Wt 82.1 kg " (181 lb)   LMP 06/02/2008   SpO2 100%   BMI 33.11 kg/m    Physical Exam  GENERAL APPEARANCE: healthy, alert and no distress  EYES: Eyes grossly normal to inspection, PERRL and conjunctivae and sclerae normal  HENT: ear canals and TM's normal, nose and mouth without ulcers or lesions, oropharynx clear and oral mucous membranes moist  NECK: no adenopathy, no asymmetry, masses, or scars and thyroid normal to palpation  RESP: lungs clear to auscultation - no rales, rhonchi or wheezes  BREAST: normal without masses, tenderness or nipple discharge and no palpable axillary masses or adenopathy  CV: regular rate and rhythm, normal S1 S2, no S3 or S4, no murmur, click or rub, no peripheral edema and peripheral pulses strong  ABDOMEN: soft, nontender, no hepatosplenomegaly, no masses and bowel sounds normal  MS: no musculoskeletal defects are noted and gait is age appropriate without ataxia  SKIN: no suspicious lesions or rashes  NEURO: Normal strength and tone, sensory exam grossly normal, mentation intact and speech normal  PSYCH: mentation appears normal and affect bright, better than usual    Diagnostic Test Results:  Labs reviewed in Epic    ASSESSMENT/PLAN:       ICD-10-CM    1. Routine general medical examination at a health care facility  Z00.00    2. Stage 3 chronic kidney disease, unspecified whether stage 3a or 3b CKD  N18.30 Albumin Random Urine Quantitative with Creat Ratio     Basic metabolic panel  (Ca, Cl, CO2, Creat, Gluc, K, Na, BUN)     Hemoglobin   3. Essential hypertension  I10    4. Prediabetes  R73.03 Hemoglobin A1c     T4 free   5. Dyslipidemia  E78.5 Lipid panel reflex to direct LDL Non-fasting   6. Acquired hypothyroidism  E03.9 TSH with free T4 reflex   7. Lipid screening  Z13.220 Lipid panel reflex to direct LDL Non-fasting   8. Screening for diabetes mellitus  Z13.1 Hemoglobin A1c   9. Screening for malignant neoplasm of cervix  Z12.4 Pap imaged thin layer screen with HPV - recommended age 30 -  "65     HPV High Risk Types DNA Cervical   10. Visit for screening mammogram  Z12.31 *MA Screening Digital Bilateral   11. Screening for colon cancer  Z12.11 Fecal colorectal cancer screen FIT   increasing amlodipine  TSH today 13, up from 8.  Seeing if patient will agree to dose increase this yr - adamantly opposed previously.    COUNSELING:  Reviewed preventive health counseling, as reflected in patient instructions    Estimated body mass index is 33.11 kg/m  as calculated from the following:    Height as of this encounter: 1.575 m (5' 2\").    Weight as of this encounter: 82.1 kg (181 lb).    Weight management plan: Discussed healthy diet and exercise guidelines    She reports that she has never smoked. She has never used smokeless tobacco.    Counseling Resources:  ATP IV Guidelines  Pooled Cohorts Equation Calculator  Breast Cancer Risk Calculator  BRCA-Related Cancer Risk Assessment: FHS-7 Tool  FRAX Risk Assessment  ICSI Preventive Guidelines  Dietary Guidelines for Americans, 2010  [employer]'s MyPlate  ASA Prophylaxis  Lung CA Screening    Brionna Gonzalez PA-C  [facility]    Patient Instructions   Keep up the physical activity and working on a bit of wt loss     Increasing amlodipine from 5 mg to 10 mg, goal BP under 130/80 - call me if not at goal    Mail the poop test     Set up mammogram    [place] Mammo Schedule    [facility] Mammo Schedule  [place] ~ [phone]  Every other Monday or Wednesday   & one Saturday morning a month    [place] ~ [phone]  Every Other Monday - rotating full day versus just PM    [place] ~ [phone]  One Monday morning per month    [place] ~ [phone]  Every Monday morning  Every Tuesday afternoon  Wed, Thurs, Fri morning and afternoon    Declined flu and pneumonia shot    Preventive Health Recommendations  Female Ages 50 - 64    Yearly exam: See your health care provider every year in order to  o Review health changes. "   o Discuss preventive care.    o Review your medicines if your doctor has prescribed any.      Get a Pap test every three years (unless you have an abnormal result and your provider advises testing more often).    If you get Pap tests with HPV test, you only need to test every 5 years, unless you have an abnormal result.     You do not need a Pap test if your uterus was removed (hysterectomy) and you have not had cancer.    You should be tested each year for STDs (sexually transmitted diseases) if you're at risk.     Have a mammogram every 1 to 2 years.    Have a colonoscopy at age 50, or have a yearly FIT test (stool test). These exams screen for colon cancer.      Have a cholesterol test every 5 years, or more often if advised.    Have a diabetes test (fasting glucose) every three years. If you are at risk for diabetes, you should have this test more often.     If you are at risk for osteoporosis (brittle bone disease), think about having a bone density scan (DEXA).    Shots: Get a flu shot each year. Get a tetanus shot every 10 years.    Nutrition:     Eat at least 5 servings of fruits and vegetables each day.    Eat whole-grain bread, whole-wheat pasta and brown rice instead of white grains and rice.    Get adequate Calcium and Vitamin D.     Lifestyle    Exercise at least 150 minutes a week (30 minutes a day, 5 days a week). This will help you control your weight and prevent disease.    Limit alcohol to one drink per day.    No smoking.     Wear sunscreen to prevent skin cancer.     See your dentist every six months for an exam and cleaning.    See your eye doctor every 1 to 2 years.

## 2020-11-12 NOTE — NURSING NOTE
"Chief Complaint   Patient presents with     Physical       Initial /80 (BP Location: Right arm, Patient Position: Chair, Cuff Size: Adult Large)   Pulse 74   Temp 98.3  F (36.8  C) (Tympanic)   Resp 12   Ht 1.575 m (5' 2\")   Wt 82.1 kg (181 lb)   LMP 06/02/2008   SpO2 100%   BMI 33.11 kg/m   Estimated body mass index is 33.11 kg/m  as calculated from the following:    Height as of this encounter: 1.575 m (5' 2\").    Weight as of this encounter: 82.1 kg (181 lb).    Patient presents to the clinic using No DME    Health Maintenance that is potentially due pending provider review:  NONE        Is there anyone who you would like to be able to receive your results? No  If yes have patient fill out REMEDIOS      "

## 2020-11-12 NOTE — PATIENT INSTRUCTIONS
Keep up the physical activity and working on a bit of wt loss     Increasing amlodipine from 5 mg to 10 mg, goal BP under 130/80 - call me if not at goal    Mail the poop test     Set up mammogram    [place] Mammo Schedule    [facility] Mammo Schedule  [place] ~ [phone]  Every other Monday or Wednesday   & one Saturday morning a month    [place] ~ [phone]  Every Other Monday - rotating full day versus just PM    [place] ~ [phone]  One Monday morning per month    [place] ~ [phone]  Every Monday morning  Every Tuesday afternoon  Wed, Thurs, Fri morning and afternoon    Declined flu and pneumonia shot    Preventive Health Recommendations  Female Ages 50 - 64    Yearly exam: See your health care provider every year in order to  o Review health changes.   o Discuss preventive care.    o Review your medicines if your doctor has prescribed any.      Get a Pap test every three years (unless you have an abnormal result and your provider advises testing more often).    If you get Pap tests with HPV test, you only need to test every 5 years, unless you have an abnormal result.     You do not need a Pap test if your uterus was removed (hysterectomy) and you have not had cancer.    You should be tested each year for STDs (sexually transmitted diseases) if you're at risk.     Have a mammogram every 1 to 2 years.    Have a colonoscopy at age 50, or have a yearly FIT test (stool test). These exams screen for colon cancer.      Have a cholesterol test every 5 years, or more often if advised.    Have a diabetes test (fasting glucose) every three years. If you are at risk for diabetes, you should have this test more often.     If you are at risk for osteoporosis (brittle bone disease), think about having a bone density scan (DEXA).    Shots: Get a flu shot each year. Get a tetanus shot every 10 years.    Nutrition:     Eat at least 5 servings of fruits and vegetables each day.    Eat whole-grain  bread, whole-wheat pasta and brown rice instead of white grains and rice.    Get adequate Calcium and Vitamin D.     Lifestyle    Exercise at least 150 minutes a week (30 minutes a day, 5 days a week). This will help you control your weight and prevent disease.    Limit alcohol to one drink per day.    No smoking.     Wear sunscreen to prevent skin cancer.     See your dentist every six months for an exam and cleaning.    See your eye doctor every 1 to 2 years.

## 2020-11-16 ENCOUNTER — HEALTH MAINTENANCE LETTER (OUTPATIENT)
Age: 65
End: 2020-11-16

## 2020-11-16 LAB
COPATH REPORT: NORMAL
PAP: NORMAL

## 2020-11-17 ENCOUNTER — MYC MEDICAL ADVICE (OUTPATIENT)
Dept: FAMILY MEDICINE | Facility: CLINIC | Age: 65
End: 2020-11-17

## 2020-11-17 DIAGNOSIS — E03.9 ACQUIRED HYPOTHYROIDISM: ICD-10-CM

## 2020-11-17 DIAGNOSIS — I10 ESSENTIAL HYPERTENSION: ICD-10-CM

## 2020-11-17 RX ORDER — AMLODIPINE BESYLATE 5 MG/1
TABLET ORAL
Qty: 90 TABLET | Refills: 3 | OUTPATIENT
Start: 2020-11-17

## 2020-11-17 RX ORDER — METOPROLOL TARTRATE 50 MG
50 TABLET ORAL 2 TIMES DAILY
Qty: 180 TABLET | Refills: 3 | Status: SHIPPED | OUTPATIENT
Start: 2020-11-17 | End: 2021-12-24

## 2020-11-17 RX ORDER — ROSUVASTATIN CALCIUM 20 MG/1
20 TABLET, COATED ORAL DAILY
Qty: 90 TABLET | Refills: 3 | Status: SHIPPED | OUTPATIENT
Start: 2020-11-17 | End: 2021-12-08

## 2020-11-17 RX ORDER — AMLODIPINE BESYLATE 10 MG/1
10 TABLET ORAL DAILY
Qty: 90 TABLET | Refills: 3 | Status: SHIPPED | OUTPATIENT
Start: 2020-11-17 | End: 2021-11-11

## 2020-11-17 RX ORDER — LEVOTHYROXINE SODIUM 50 UG/1
CAPSULE ORAL
Qty: 90 CAPSULE | Refills: 3 | Status: SHIPPED | OUTPATIENT
Start: 2020-11-17 | End: 2020-11-18

## 2020-11-17 RX ORDER — LEVOTHYROXINE SODIUM 50 UG/1
CAPSULE ORAL
Qty: 90 CAPSULE | Refills: 3 | OUTPATIENT
Start: 2020-11-17

## 2020-11-17 NOTE — RESULT ENCOUNTER NOTE
Hilda,    Urine protein test is normal.  Kidney function is about the same.    Hemoglobin and blood salts are normal.  Blood sugars are still running prediabetic range but have not worsened.  TSH thyroid lab suggests we should increase thyroid dose.  I know you've had strong preferences in the past to not increase, and T4 level is ok - therefore we can continue same dose if this is still strongly your preference.  Let me know if you want me to increase the dose, otherwise I sent refills of your current dose.    Brionna

## 2020-11-18 LAB
FINAL DIAGNOSIS: NORMAL
HPV HR 12 DNA CVX QL NAA+PROBE: NEGATIVE
HPV16 DNA SPEC QL NAA+PROBE: NEGATIVE
HPV18 DNA SPEC QL NAA+PROBE: NEGATIVE
SPECIMEN DESCRIPTION: NORMAL
SPECIMEN SOURCE CVX/VAG CYTO: NORMAL

## 2020-11-18 RX ORDER — LEVOTHYROXINE SODIUM 50 UG/1
CAPSULE ORAL
Qty: 90 CAPSULE | Refills: 3 | Status: SHIPPED | OUTPATIENT
Start: 2020-11-18 | End: 2021-11-11

## 2021-09-12 ENCOUNTER — HEALTH MAINTENANCE LETTER (OUTPATIENT)
Age: 66
End: 2021-09-12

## 2021-11-07 ENCOUNTER — HEALTH MAINTENANCE LETTER (OUTPATIENT)
Age: 66
End: 2021-11-07

## 2021-11-08 DIAGNOSIS — E03.9 ACQUIRED HYPOTHYROIDISM: ICD-10-CM

## 2021-11-08 DIAGNOSIS — I10 ESSENTIAL HYPERTENSION: ICD-10-CM

## 2021-11-11 RX ORDER — LEVOTHYROXINE SODIUM 50 UG/1
CAPSULE ORAL
Qty: 30 CAPSULE | Refills: 0 | Status: SHIPPED | OUTPATIENT
Start: 2021-11-11 | End: 2021-12-08

## 2021-11-11 RX ORDER — AMLODIPINE BESYLATE 10 MG/1
TABLET ORAL
Qty: 30 TABLET | Refills: 0 | Status: SHIPPED | OUTPATIENT
Start: 2021-11-11 | End: 2021-12-08

## 2021-12-07 DIAGNOSIS — E78.5 DYSLIPIDEMIA: ICD-10-CM

## 2021-12-07 DIAGNOSIS — Z86.73 HISTORY OF ISCHEMIC STROKE WITHOUT RESIDUAL DEFICITS: ICD-10-CM

## 2021-12-07 DIAGNOSIS — I10 ESSENTIAL HYPERTENSION: ICD-10-CM

## 2021-12-07 DIAGNOSIS — E03.9 ACQUIRED HYPOTHYROIDISM: ICD-10-CM

## 2021-12-07 NOTE — LETTER
[facility]  [address] [place] [id number]  Phone: [phone]  Fax: [phone]        December 8, 2021      Hilda JERALD Dakotah                                                                                                                                [address] [facility] [address]            Dear Ms. Claire,    We are concerned about your health care.  We recently provided you with a medication refills.  Many medications require routine follow-up with your Doctor.      At this time we ask that: You schedule an appointment for your annual physical.    Your prescription: Has been refilled for 1 month so you may have time for the above noted follow-up.      Thank you,      Brionna Gonzalez PA-C/ Charito Drake RN

## 2021-12-08 RX ORDER — ROSUVASTATIN CALCIUM 20 MG/1
20 TABLET, COATED ORAL DAILY
Qty: 30 TABLET | Refills: 0 | Status: SHIPPED | OUTPATIENT
Start: 2021-12-08 | End: 2021-12-24

## 2021-12-08 RX ORDER — AMLODIPINE BESYLATE 10 MG/1
TABLET ORAL
Qty: 30 TABLET | Refills: 0 | Status: SHIPPED | OUTPATIENT
Start: 2021-12-08 | End: 2021-12-24

## 2021-12-08 RX ORDER — LEVOTHYROXINE SODIUM 50 UG/1
CAPSULE ORAL
Qty: 30 CAPSULE | Refills: 0 | Status: SHIPPED | OUTPATIENT
Start: 2021-12-08 | End: 2021-12-24

## 2021-12-08 NOTE — TELEPHONE ENCOUNTER
BP Readings from Last 6 Encounters:   11/12/20 126/80   11/22/19 132/78   11/16/18 121/72   12/29/17 119/77   11/17/17 132/86   11/11/16 128/76     Lab Results   Component Value Date    CHOL 153 11/12/2020     Lab Results   Component Value Date    HDL 67 11/12/2020     Lab Results   Component Value Date    LDL 24 11/12/2020     Lab Results   Component Value Date    TRIG 311 11/12/2020     Lab Results   Component Value Date    CHOLHDLRATIO 6.5 11/12/2015     TSH   Date Value Ref Range Status   11/12/2020 13.74 (H) 0.40 - 4.00 mU/L Final   sent letter and My Chart note stating needs appointment with the doctor for further refill.  Charito Drake RN

## 2021-12-19 ASSESSMENT — ENCOUNTER SYMPTOMS
DIARRHEA: 0
BREAST MASS: 0
ABDOMINAL PAIN: 0
HEMATOCHEZIA: 0
SORE THROAT: 0
JOINT SWELLING: 0
PALPITATIONS: 0
HEMATURIA: 0
PARESTHESIAS: 0
ARTHRALGIAS: 0
SHORTNESS OF BREATH: 0
EYE PAIN: 0
NERVOUS/ANXIOUS: 0
HEADACHES: 0
WEAKNESS: 0
NAUSEA: 0
HEARTBURN: 0
MYALGIAS: 0
FREQUENCY: 0
CHILLS: 0
DYSURIA: 0
CONSTIPATION: 0
COUGH: 0
FEVER: 0
DIZZINESS: 0

## 2021-12-23 ASSESSMENT — ENCOUNTER SYMPTOMS
HEARTBURN: 0
COUGH: 0
CONSTIPATION: 0
FREQUENCY: 0
CHILLS: 0
SORE THROAT: 0
MYALGIAS: 0
WEAKNESS: 0
FEVER: 0
HEMATOCHEZIA: 0
DIARRHEA: 0
ARTHRALGIAS: 0
HEADACHES: 0
ABDOMINAL PAIN: 0
DIZZINESS: 0
SHORTNESS OF BREATH: 0
NERVOUS/ANXIOUS: 0
PARESTHESIAS: 0
HEMATURIA: 0
PALPITATIONS: 0
BREAST MASS: 0
JOINT SWELLING: 0
NAUSEA: 0
DYSURIA: 0
EYE PAIN: 0

## 2021-12-23 NOTE — PATIENT INSTRUCTIONS
Routine general medical examination at a health care facility  - Hemoglobin; Future    Essential hypertension  Chronic, stable.  Last increased dose of Norvasc to 10 mg patient noted slight ankle swelling.  Nothing too concerning.  We will continue current dose without any changes.  Patient to notify provider if swelling worsens.  We will recheck labs today as well notify patient of results and any further recommendations.  - amLODIPine (NORVASC) 10 MG tablet; TAKE 1 TABLET (10 MG) BY MOUTH DAILY  - metoprolol tartrate (LOPRESSOR) 50 MG tablet; Take 1 tablet (50 mg) by mouth 2 times daily    Stage 3 chronic kidney disease, unspecified whether stage 3a or 3b CKD (H)  Chronic, stable.  We will recheck labs today and notify patient of results.  - Albumin Random Urine Quantitative with Creat Ratio; Future  - Hemoglobin; Future  - Basic metabolic panel  (Ca, Cl, CO2, Creat, Gluc, K, Na, BUN); Future    Acquired hypothyroidism  Chronic, last TSH elevated.  Patient specific in regards to changing dosages of medications, no dosage adjustments were made at that time.  Tolerating medication without any side effects.  We will recheck hormone today and notify patient of results and any further recommendations.  - TSH WITH FREE T4 REFLEX; Future  - levothyroxine (TIROSINT) 50 MCG capsule; TAKE ONE CAPSULE BY MOUTH ONCE DAILY    Dyslipidemia  Chronic, stable.  Patient had orange juice this morning, will do nonfasting.  - Lipid panel reflex to direct LDL Non-fasting; Future  - rosuvastatin (CRESTOR) 20 MG tablet; Take 1 tablet (20 mg) by mouth daily    History of ischemic stroke without residual deficits  Chronic, stable.  No changes.  - rosuvastatin (CRESTOR) 20 MG tablet; Take 1 tablet (20 mg) by mouth daily    Screening for hyperlipidemia  Screening.  - Lipid panel reflex to direct LDL Non-fasting; Future    Visit for screening mammogram  Screening, patient can call [phone] to schedule.  - MA SCREENING DIGITAL BILAT -  Future  (s+30); Future    Colon cancer screening  Patient agreeable to proceed with colonoscopy for colon cancer screening.  Should be receiving a phone call to schedule.  - Adult Gastro Ref - Procedure Only; Future        Preventive Health Recommendations  Female Ages 50 - 64    Yearly exam: See your health care provider every year in order to  o Review health changes.   o Discuss preventive care.    o Review your medicines if your doctor has prescribed any.      Get a Pap test every three years (unless you have an abnormal result and your provider advises testing more often).    If you get Pap tests with HPV test, you only need to test every 5 years, unless you have an abnormal result.     You do not need a Pap test if your uterus was removed (hysterectomy) and you have not had cancer.    You should be tested each year for STDs (sexually transmitted diseases) if you're at risk.     Have a mammogram every 1 to 2 years.    Have a colonoscopy at age 50, or have a yearly FIT test (stool test). These exams screen for colon cancer.      Have a cholesterol test every 5 years, or more often if advised.    Have a diabetes test (fasting glucose) every three years. If you are at risk for diabetes, you should have this test more often.     If you are at risk for osteoporosis (brittle bone disease), think about having a bone density scan (DEXA).    Shots: Get a flu shot each year. Get a tetanus shot every 10 years.    Nutrition:     Eat at least 5 servings of fruits and vegetables each day.    Eat whole-grain bread, whole-wheat pasta and brown rice instead of white grains and rice.    Get adequate Calcium and Vitamin D.     Lifestyle    Exercise at least 150 minutes a week (30 minutes a day, 5 days a week). This will help you control your weight and prevent disease.    Limit alcohol to one drink per day.    No smoking.     Wear sunscreen to prevent skin cancer.     See your dentist every six months for an exam and  cleaning.    See your eye doctor every 1 to 2 years.

## 2021-12-23 NOTE — PROGRESS NOTES
SUBJECTIVE:   CC: Hilda Claire is an 66 year old woman who presents for preventive health visit.       Patient has been advised of split billing requirements and indicates understanding: Yes  Healthy Habits:     Getting at least 3 servings of Calcium per day:  Yes    Bi-annual eye exam:  Yes    Dental care twice a year:  Yes    Sleep apnea or symptoms of sleep apnea:  None    Diet:  Regular (no restrictions)    Frequency of exercise:  4-5 days/week    Duration of exercise:  15-30 minutes    Taking medications regularly:  Yes    Medication side effects:  Not applicable and None    PHQ-2 Total Score: 0    Additional concerns today:  No          Today's PHQ-2 Score:   PHQ-2 ( 1999 [employer]) 12/19/2021   Q1: Little interest or pleasure in doing things 0   Q2: Feeling down, depressed or hopeless 0   PHQ-2 Score 0   PHQ-2 Total Score (12-17 Years)- Positive if 3 or more points; Administer PHQ-A if positive -   Q1: Little interest or pleasure in doing things Not at all   Q2: Feeling down, depressed or hopeless Not at all   PHQ-2 Score 0       Abuse: Current or Past (Physical, Sexual or Emotional) - No  Do you feel safe in your environment? Yes        Social History     Tobacco Use     Smoking status: Never Smoker     Smokeless tobacco: Never Used   Substance Use Topics     Alcohol use: Yes     Comment: minimal         Alcohol Use 12/19/2021   Prescreen: >3 drinks/day or >7 drinks/week? No   Prescreen: >3 drinks/day or >7 drinks/week? -       Reviewed orders with patient.  Reviewed health maintenance and updated orders accordingly - Yes  Lab work is in process  Labs reviewed in EPIC  BP Readings from Last 3 Encounters:   12/24/21 132/70   11/12/20 126/80   11/22/19 132/78    Wt Readings from Last 3 Encounters:   12/24/21 84.4 kg (186 lb)   11/12/20 82.1 kg (181 lb)   11/22/19 79.8 kg (176 lb)                  Patient Active Problem List   Diagnosis     Essential hypertension     Acquired hypothyroidism     Excessive  or frequent menstruation     Acute ischemic stroke (H)     Prediabetes     CKD (chronic kidney disease) stage 3, GFR 30-59 ml/min (H)     Dyslipidemia     Past Surgical History:   Procedure Laterality Date     ABDOMEN SURGERY  1995         SURGICAL HISTORY OF -   1995           Social History     Tobacco Use     Smoking status: Never Smoker     Smokeless tobacco: Never Used   Substance Use Topics     Alcohol use: Yes     Comment: minimal     Family History   Problem Relation Age of Onset     Arthritis Mother      Osteoporosis Mother         elder     C.A.D. Father         By pass   at 72     Leukemia Father      Diabetes Maternal Grandmother         type 2     Cerebrovascular Disease Paternal Grandmother         at older age     Cardiovascular Paternal Grandfather         MI early 70's         Current Outpatient Medications   Medication Sig Dispense Refill     amLODIPine (NORVASC) 10 MG tablet TAKE 1 TABLET (10 MG) BY MOUTH DAILY 90 tablet 3     aspirin  MG tablet Take 1 tablet (325 mg) by mouth daily For stroke prevention.       levothyroxine (TIROSINT) 50 MCG capsule TAKE ONE CAPSULE BY MOUTH ONCE DAILY 90 capsule 3     loperamide (IMODIUM) 2 MG capsule Take 2 mg by mouth 4 times daily as needed for diarrhea       metoprolol tartrate (LOPRESSOR) 50 MG tablet Take 1 tablet (50 mg) by mouth 2 times daily 180 tablet 3     rosuvastatin (CRESTOR) 20 MG tablet Take 1 tablet (20 mg) by mouth daily 90 tablet 3     No Known Allergies    Breast Cancer Screening:    Breast CA Risk Assessment (FHS-7) 2021   Do you have a family history of breast, colon, or ovarian cancer? No / Unknown       Mammogram Screening: Recommended mammography every 1-2 years with patient discussion and risk factor consideration  Pertinent mammograms are reviewed under the imaging tab.    History of abnormal Pap smear: NO - age 30-65 PAP every 5 years with negative HPV co-testing recommended  PAP / HPV Latest  Ref Rng & Units 2020   PAP (Historical) - NIL NIL ASC-US(A)   HPV16 NEG:Negative Negative Negative -   HPV18 NEG:Negative Negative Negative -   HRHPV NEG:Negative Negative Negative -     Reviewed and updated as needed this visit by clinical staff  Tobacco   Meds  Problems            Reviewed and updated as needed this visit by Provider    Meds  Problems           Past Medical History:   Diagnosis Date     Cerebral infarction (H)      Excessive or frequent menstruation 10/3/2007    10/3/2007: last PAP with some endometrial cells.  On US uterus and pelvis looks OK.  She reports some continuing menses on a regular basis q 4 weeks since last visit, no intermenstrual flow, LMP=.  Her endometrium is 6 mm which is OK pre-menopausal approaching menopause.  No further workup unless irregular bleeding.     Hypertension      Hypothyroidism       Past Surgical History:   Procedure Laterality Date     ABDOMEN SURGERY  1995         SURGICAL HISTORY OF -   1995         OB History   No obstetric history on file.       Review of Systems   Constitutional: Negative for chills and fever.   HENT: Negative for congestion, ear pain, hearing loss and sore throat.    Eyes: Negative for pain and visual disturbance.   Respiratory: Negative for cough and shortness of breath.    Cardiovascular: Negative for chest pain, palpitations and peripheral edema.   Gastrointestinal: Negative for abdominal pain, constipation, diarrhea, heartburn, hematochezia and nausea.   Breasts:  Negative for tenderness, breast mass and discharge.   Genitourinary: Negative for dysuria, frequency, genital sores, hematuria, pelvic pain, urgency, vaginal bleeding and vaginal discharge.   Musculoskeletal: Negative for arthralgias, joint swelling and myalgias.   Skin: Negative for rash.   Neurological: Negative for dizziness, weakness, headaches and paresthesias.   Psychiatric/Behavioral: Negative for mood  "changes. The patient is not nervous/anxious.        OBJECTIVE:   /70   Pulse 61   Temp 98.1  F (36.7  C)   Resp 24   Ht 1.575 m (5' 2\")   Wt 84.4 kg (186 lb)   LMP 06/02/2008   SpO2 99%   Breastfeeding No   BMI 34.02 kg/m    Physical Exam  GENERAL APPEARANCE: healthy, alert and no distress  EYES: Eyes grossly normal to inspection, PERRL and conjunctivae and sclerae normal  HENT: ear canals and TM's normal, nose and mouth without ulcers or lesions, oropharynx clear and oral mucous membranes moist  NECK: no adenopathy, no asymmetry, masses, or scars and thyroid normal to palpation  RESP: lungs clear to auscultation - no rales, rhonchi or wheezes  BREAST: normal without masses, tenderness or nipple discharge and no palpable axillary masses or adenopathy  CV: regular rate and rhythm, normal S1 S2, no S3 or S4, no murmur, click or rub, no peripheral edema and peripheral pulses strong  ABDOMEN: soft, nontender, no hepatosplenomegaly, no masses and bowel sounds normal   (female): normal female external genitalia, normal urethral meatus, vaginal mucosal atrophy noted, normal cervix, adnexae, and uterus without masses or abnormal discharge  MS: no musculoskeletal defects are noted and gait is age appropriate without ataxia  SKIN: no suspicious lesions or rashes  NEURO: Normal strength and tone, sensory exam grossly normal, mentation intact and speech normal  PSYCH: mentation appears normal and affect normal/bright    Diagnostic Test Results:  Labs reviewed in Epic  Results for orders placed or performed in visit on 12/24/21   Basic metabolic panel  (Ca, Cl, CO2, Creat, Gluc, K, Na, BUN)     Status: Abnormal   Result Value Ref Range    Sodium 137 133 - 144 mmol/L    Potassium 4.1 3.4 - 5.3 mmol/L    Chloride 104 94 - 109 mmol/L    Carbon Dioxide (CO2) 27 20 - 32 mmol/L    Anion Gap 6 3 - 14 mmol/L    Urea Nitrogen 17 7 - 30 mg/dL    Creatinine 1.07 (H) 0.52 - 1.04 mg/dL    Calcium 9.5 8.5 - 10.1 mg/dL    " Glucose 125 (H) 70 - 99 mg/dL    GFR Estimate 58 (L) >60 mL/min/1.73m2   Hemoglobin     Status: Normal   Result Value Ref Range    Hemoglobin 13.5 11.7 - 15.7 g/dL   TSH WITH FREE T4 REFLEX     Status: Abnormal   Result Value Ref Range    TSH 9.27 (H) 0.40 - 4.00 mU/L   Albumin Random Urine Quantitative with Creat Ratio     Status: None   Result Value Ref Range    Creatinine Urine mg/dL 112 mg/dL    Albumin Urine mg/L 14 mg/L    Albumin Urine mg/g Cr 12.50 0.00 - 25.00 mg/g Cr   Lipid panel reflex to direct LDL Non-fasting     Status: Abnormal   Result Value Ref Range    Cholesterol 157 <200 mg/dL    Triglycerides 215 (H) <150 mg/dL    Direct Measure HDL 64 >=50 mg/dL    LDL Cholesterol Calculated 50 <=100 mg/dL    Non HDL Cholesterol 93 <130 mg/dL    Patient Fasting > 8hrs? No     Narrative    Cholesterol  Desirable:  <200 mg/dL    Triglycerides  Normal:  Less than 150 mg/dL  Borderline High:  150-199 mg/dL  High:  200-499 mg/dL  Very High:  Greater than or equal to 500 mg/dL    Direct Measure HDL  Female:  Greater than or equal to 50 mg/dL   Male:  Greater than or equal to 40 mg/dL    LDL Cholesterol  Desirable:  <100mg/dL  Above Desirable:  100-129 mg/dL   Borderline High:  130-159 mg/dL   High:  160-189 mg/dL   Very High:  >= 190 mg/dL    Non HDL Cholesterol  Desirable:  130 mg/dL  Above Desirable:  130-159 mg/dL  Borderline High:  160-189 mg/dL  High:  190-219 mg/dL  Very High:  Greater than or equal to 220 mg/dL   T4 free     Status: Normal   Result Value Ref Range    Free T4 0.91 0.76 - 1.46 ng/dL       ASSESSMENT/PLAN:   Routine general medical examination at a health care facility  - Hemoglobin; Future    Essential hypertension  Chronic, stable.  Last increased dose of Norvasc to 10 mg patient noted slight ankle swelling.  Nothing too concerning.  We will continue current dose without any changes.  Patient to notify provider if swelling worsens.  We will recheck labs today as well notify patient of results  and any further recommendations.  - amLODIPine (NORVASC) 10 MG tablet; TAKE 1 TABLET (10 MG) BY MOUTH DAILY  - metoprolol tartrate (LOPRESSOR) 50 MG tablet; Take 1 tablet (50 mg) by mouth 2 times daily    Stage 3 chronic kidney disease, unspecified whether stage 3a or 3b CKD (H)  Chronic, stable.  We will recheck labs today and notify patient of results.  - Albumin Random Urine Quantitative with Creat Ratio; Future  - Hemoglobin; Future  - Basic metabolic panel  (Ca, Cl, CO2, Creat, Gluc, K, Na, BUN); Future    Acquired hypothyroidism  Chronic, last TSH elevated.  Patient specific in regards to changing dosages of medications, no dosage adjustments were made at that time.  Tolerating medication without any side effects.  We will recheck hormone today and notify patient of results and any further recommendations.  - TSH WITH FREE T4 REFLEX; Future  - levothyroxine (TIROSINT) 50 MCG capsule; TAKE ONE CAPSULE BY MOUTH ONCE DAILY    Dyslipidemia  Chronic, stable.  Patient had orange juice this morning, will do nonfasting.  - Lipid panel reflex to direct LDL Non-fasting; Future  - rosuvastatin (CRESTOR) 20 MG tablet; Take 1 tablet (20 mg) by mouth daily    History of ischemic stroke without residual deficits  Chronic, stable.  No changes.  - rosuvastatin (CRESTOR) 20 MG tablet; Take 1 tablet (20 mg) by mouth daily    Screening for hyperlipidemia  Screening.  - Lipid panel reflex to direct LDL Non-fasting; Future    Visit for screening mammogram  Screening, patient can call [phone] to schedule.  - MA SCREENING DIGITAL BILAT - Future  (s+30); Future    Colon cancer screening  Patient agreeable to proceed with colonoscopy for colon cancer screening.  Should be receiving a phone call to schedule.  - Adult Gastro Ref - Procedure Only; Future     Patient has been advised of split billing requirements and indicates understanding: Yes  COUNSELING:  Reviewed preventive health counseling, as reflected in patient  "instructions    Estimated body mass index is 34.02 kg/m  as calculated from the following:    Height as of this encounter: 1.575 m (5' 2\").    Weight as of this encounter: 84.4 kg (186 lb).    Weight management plan: Discussed healthy diet and exercise guidelines    She reports that she has never smoked. She has never used smokeless tobacco.      Counseling Resources:  ATP IV Guidelines  Pooled Cohorts Equation Calculator  Breast Cancer Risk Calculator  BRCA-Related Cancer Risk Assessment: FHS-7 Tool  FRAX Risk Assessment  ICSI Preventive Guidelines  Dietary Guidelines for Americans, 2010  USDA's MyPlate  ASA Prophylaxis  Lung CA Screening    MALVIN Chase CNP  [facility]  "

## 2021-12-24 ENCOUNTER — OFFICE VISIT (OUTPATIENT)
Dept: FAMILY MEDICINE | Facility: CLINIC | Age: 66
End: 2021-12-24
Payer: COMMERCIAL

## 2021-12-24 VITALS
BODY MASS INDEX: 34.23 KG/M2 | SYSTOLIC BLOOD PRESSURE: 132 MMHG | HEART RATE: 61 BPM | HEIGHT: 62 IN | OXYGEN SATURATION: 99 % | WEIGHT: 186 LBS | RESPIRATION RATE: 24 BRPM | TEMPERATURE: 98.1 F | DIASTOLIC BLOOD PRESSURE: 70 MMHG

## 2021-12-24 DIAGNOSIS — Z13.220 SCREENING FOR HYPERLIPIDEMIA: ICD-10-CM

## 2021-12-24 DIAGNOSIS — N18.30 STAGE 3 CHRONIC KIDNEY DISEASE, UNSPECIFIED WHETHER STAGE 3A OR 3B CKD (H): ICD-10-CM

## 2021-12-24 DIAGNOSIS — Z86.73 HISTORY OF ISCHEMIC STROKE WITHOUT RESIDUAL DEFICITS: ICD-10-CM

## 2021-12-24 DIAGNOSIS — Z12.11 COLON CANCER SCREENING: ICD-10-CM

## 2021-12-24 DIAGNOSIS — Z00.00 ROUTINE GENERAL MEDICAL EXAMINATION AT A HEALTH CARE FACILITY: Primary | ICD-10-CM

## 2021-12-24 DIAGNOSIS — E78.5 DYSLIPIDEMIA: ICD-10-CM

## 2021-12-24 DIAGNOSIS — I10 ESSENTIAL HYPERTENSION: ICD-10-CM

## 2021-12-24 DIAGNOSIS — E03.9 ACQUIRED HYPOTHYROIDISM: ICD-10-CM

## 2021-12-24 DIAGNOSIS — Z12.31 VISIT FOR SCREENING MAMMOGRAM: ICD-10-CM

## 2021-12-24 LAB
ANION GAP SERPL CALCULATED.3IONS-SCNC: 6 MMOL/L (ref 3–14)
BUN SERPL-MCNC: 17 MG/DL (ref 7–30)
CALCIUM SERPL-MCNC: 9.5 MG/DL (ref 8.5–10.1)
CHLORIDE BLD-SCNC: 104 MMOL/L (ref 94–109)
CHOLEST SERPL-MCNC: 157 MG/DL
CO2 SERPL-SCNC: 27 MMOL/L (ref 20–32)
CREAT SERPL-MCNC: 1.07 MG/DL (ref 0.52–1.04)
CREAT UR-MCNC: 112 MG/DL
FASTING STATUS PATIENT QL REPORTED: NO
GFR SERPL CREATININE-BSD FRML MDRD: 58 ML/MIN/1.73M2
GLUCOSE BLD-MCNC: 125 MG/DL (ref 70–99)
HDLC SERPL-MCNC: 64 MG/DL
HGB BLD-MCNC: 13.5 G/DL (ref 11.7–15.7)
LDLC SERPL CALC-MCNC: 50 MG/DL
MICROALBUMIN UR-MCNC: 14 MG/L
MICROALBUMIN/CREAT UR: 12.5 MG/G CR (ref 0–25)
NONHDLC SERPL-MCNC: 93 MG/DL
POTASSIUM BLD-SCNC: 4.1 MMOL/L (ref 3.4–5.3)
SODIUM SERPL-SCNC: 137 MMOL/L (ref 133–144)
T4 FREE SERPL-MCNC: 0.91 NG/DL (ref 0.76–1.46)
TRIGL SERPL-MCNC: 215 MG/DL
TSH SERPL DL<=0.005 MIU/L-ACNC: 9.27 MU/L (ref 0.4–4)

## 2021-12-24 PROCEDURE — 99214 OFFICE O/P EST MOD 30 MIN: CPT | Mod: 25 | Performed by: NURSE PRACTITIONER

## 2021-12-24 PROCEDURE — 85018 HEMOGLOBIN: CPT | Performed by: NURSE PRACTITIONER

## 2021-12-24 PROCEDURE — 84443 ASSAY THYROID STIM HORMONE: CPT | Performed by: NURSE PRACTITIONER

## 2021-12-24 PROCEDURE — 82043 UR ALBUMIN QUANTITATIVE: CPT | Performed by: NURSE PRACTITIONER

## 2021-12-24 PROCEDURE — 80061 LIPID PANEL: CPT | Performed by: NURSE PRACTITIONER

## 2021-12-24 PROCEDURE — 80048 BASIC METABOLIC PNL TOTAL CA: CPT | Performed by: NURSE PRACTITIONER

## 2021-12-24 PROCEDURE — 99396 PREV VISIT EST AGE 40-64: CPT | Performed by: NURSE PRACTITIONER

## 2021-12-24 PROCEDURE — 84439 ASSAY OF FREE THYROXINE: CPT | Performed by: NURSE PRACTITIONER

## 2021-12-24 PROCEDURE — 36415 COLL VENOUS BLD VENIPUNCTURE: CPT | Performed by: NURSE PRACTITIONER

## 2021-12-24 RX ORDER — AMLODIPINE BESYLATE 10 MG/1
TABLET ORAL
Qty: 90 TABLET | Refills: 3 | Status: SHIPPED | OUTPATIENT
Start: 2021-12-24 | End: 2022-12-28

## 2021-12-24 RX ORDER — ROSUVASTATIN CALCIUM 20 MG/1
20 TABLET, COATED ORAL DAILY
Qty: 90 TABLET | Refills: 3 | Status: SHIPPED | OUTPATIENT
Start: 2021-12-24 | End: 2022-12-28

## 2021-12-24 RX ORDER — LEVOTHYROXINE SODIUM 50 UG/1
CAPSULE ORAL
Qty: 90 CAPSULE | Refills: 3 | Status: SHIPPED | OUTPATIENT
Start: 2021-12-24 | End: 2022-12-28

## 2021-12-24 RX ORDER — METOPROLOL TARTRATE 50 MG
50 TABLET ORAL 2 TIMES DAILY
Qty: 180 TABLET | Refills: 3 | Status: SHIPPED | OUTPATIENT
Start: 2021-12-24 | End: 2022-12-28

## 2021-12-24 ASSESSMENT — MIFFLIN-ST. JEOR: SCORE: 1346.94

## 2021-12-24 ASSESSMENT — PAIN SCALES - GENERAL: PAINLEVEL: NO PAIN (0)

## 2022-05-08 ENCOUNTER — E-VISIT (OUTPATIENT)
Dept: FAMILY MEDICINE | Facility: CLINIC | Age: 67
End: 2022-05-08
Payer: COMMERCIAL

## 2022-05-08 DIAGNOSIS — R05.9 COUGH: ICD-10-CM

## 2022-05-08 DIAGNOSIS — J00 ACUTE RHINITIS: Primary | ICD-10-CM

## 2022-05-08 PROCEDURE — 99421 OL DIG E/M SVC 5-10 MIN: CPT | Performed by: NURSE PRACTITIONER

## 2022-05-09 ENCOUNTER — LAB (OUTPATIENT)
Dept: FAMILY MEDICINE | Facility: CLINIC | Age: 67
End: 2022-05-09
Attending: NURSE PRACTITIONER
Payer: COMMERCIAL

## 2022-05-09 DIAGNOSIS — R05.9 COUGH: ICD-10-CM

## 2022-05-09 PROCEDURE — U0005 INFEC AGEN DETEC AMPLI PROBE: HCPCS

## 2022-05-09 PROCEDURE — U0003 INFECTIOUS AGENT DETECTION BY NUCLEIC ACID (DNA OR RNA); SEVERE ACUTE RESPIRATORY SYNDROME CORONAVIRUS 2 (SARS-COV-2) (CORONAVIRUS DISEASE [COVID-19]), AMPLIFIED PROBE TECHNIQUE, MAKING USE OF HIGH THROUGHPUT TECHNOLOGIES AS DESCRIBED BY CMS-2020-01-R: HCPCS

## 2022-05-09 RX ORDER — BENZONATATE 100 MG/1
100-200 CAPSULE ORAL 3 TIMES DAILY PRN
Qty: 42 CAPSULE | Refills: 0 | Status: SHIPPED | OUTPATIENT
Start: 2022-05-09 | End: 2022-12-28

## 2022-05-09 RX ORDER — FLUTICASONE PROPIONATE 50 MCG
2 SPRAY, SUSPENSION (ML) NASAL DAILY
Qty: 16 G | Refills: 1 | Status: SHIPPED | OUTPATIENT
Start: 2022-05-09 | End: 2022-12-28

## 2022-05-09 NOTE — PATIENT INSTRUCTIONS
Dear Jahaira,      Based on your responses, I believe this is a viral versus allergic rhinitis (a cold caused by allergies or a virus). I would like to start conservative management with Flonase nasal spray, 2 sprays each nostril daily.  Please be sure you are pointing nozzle out towards the eye when administering the nasal spray.  Also like you to start an over-the-counter antihistamine such as Claritin or Zyrtec daily and have also put through a prescription for Tessalon, which you can use up to 3 times a day for cough.  Given your symptoms I would also like to test for coronavirus (COVID-19). If symptoms not improving or worsening in the next 5-7 days recommend evaluation in clinic.     Will I be tested for COVID-19?  We would like to test you for COVID. I have placed orders for this test.     To schedule: go to your [facility] home page and scroll down to the section that says  You have an appointment that needs to be scheduled  and click the large green button that says  Schedule Now  and follow the steps to find the next available openings.    If you are unable to complete these [facility] scheduling steps, please call [phone] to schedule your testing.     These guidelines are for isolating before returning to work, school or .   For employers, schools and day cares: This is an official notice for this person s medical guidelines for returning in-person.   For health care sites: The CDC gives different isolation and quarantine guidelines for healthcare sites, please check with these sites before arriving.     How do I self-isolate?  You isolate when you have symptoms of COVID or a test shows you have COVID, even if you don t have symptoms.   If you DO have symptoms:  Stay home and away from others  For at least 5 days after your symptoms started, AND   You are fever free for 24 hours (with no medicine that reduces fever), AND  Your other symptoms are better.  Wear a mask for 10 full days any time you are  around others.  If you DON T have symptoms:  Stay at home and away from others for at least 5 days after your positive test.  Wear a mask for 10 full days any time you are around others.    How can I take care of myself?  Over the counter medications may help with your symptoms such as runny or stuffy nose, cough, chills, or fever.  Talk to your care team about your options.     Some people are at high risk of severe illness (for example, you have a weak immune system, you re 65 years or older, or you have certain medical problems). If your risk is high and your symptoms started in the last 5 to 7 days, we strongly recommend for you to get COVID treatment as soon as possible. Paxlovid, Molnupiravir and the monoclonal antibody treatments are proven safe and effective, make you feel better faster, and prevent hospitalization and death.       To schedule an appointment to discuss COVID treatment, request an appointment on [employer] (select  COVID-19 Treatment ) or call [address] [place] [address] ([phone]), press 7.    Get lots of rest. Drink extra fluids (unless a doctor has told you not to)  Take Tylenol (acetaminophen) or ibuprofen for fever or pain. If you have liver or kidney problems, ask your family doctor if it's okay to take Tylenol or ibuprofen  Take over the counter medications for your symptoms, as directed by your doctor. You may also talk to your pharmacist.    If you have other health problems (like cancer, heart failure, an organ transplant or severe kidney disease): Call your specialty clinic if you don't feel better in the next 2 days.  Know when to call 911. Emergency warning signs include:  Trouble breathing or shortness of breath  Pain or pressure in the chest that doesn't go away  Feeling confused like you haven't felt before, or not being able to wake up  Bluish-colored lips or face    Where can I get more information?   [facility] [facility] - About COVID-19: [email or web address]   CDC - What to Do  If You're Sick: https://www.cdc.gov/coronavirus/2019-ncov/if-you-are-sick/index.html   CDC - Quarantine & Isolation: https://www.cdc.gov/coronavirus/2019-ncov/your-health/quarantine-isolation.html   [facility] clinical trials (COVID-19 research studies): [email or web address].[facility]-clinical-trials  Below are the COVID-19 hotlines at the [facility] ([facility]). Interpreters are available.  For health questions: Call 475-173-7128 or 1-424.833.4043 (7 a.m. to 7 p.m.)  For questions about schools and childcare: Call 580-515-4089 or [phone] (7 a.m. to 7 p.m.)

## 2022-05-10 LAB — SARS-COV-2 RNA RESP QL NAA+PROBE: NEGATIVE

## 2022-05-11 ENCOUNTER — MYC MEDICAL ADVICE (OUTPATIENT)
Dept: FAMILY MEDICINE | Facility: CLINIC | Age: 67
End: 2022-05-11
Payer: COMMERCIAL

## 2022-05-12 ENCOUNTER — OFFICE VISIT (OUTPATIENT)
Dept: URGENT CARE | Facility: URGENT CARE | Age: 67
End: 2022-05-12
Payer: COMMERCIAL

## 2022-05-12 VITALS
WEIGHT: 175 LBS | TEMPERATURE: 98.2 F | BODY MASS INDEX: 32.01 KG/M2 | OXYGEN SATURATION: 98 % | RESPIRATION RATE: 18 BRPM | SYSTOLIC BLOOD PRESSURE: 130 MMHG | HEART RATE: 98 BPM | DIASTOLIC BLOOD PRESSURE: 78 MMHG

## 2022-05-12 DIAGNOSIS — J20.6 ACUTE BRONCHITIS DUE TO RHINOVIRUS: Primary | ICD-10-CM

## 2022-05-12 PROCEDURE — 99213 OFFICE O/P EST LOW 20 MIN: CPT | Performed by: PHYSICIAN ASSISTANT

## 2022-05-12 RX ORDER — PREDNISONE 20 MG/1
20 TABLET ORAL 2 TIMES DAILY
Qty: 10 TABLET | Refills: 0 | Status: SHIPPED | OUTPATIENT
Start: 2022-05-12 | End: 2022-05-17

## 2022-05-12 RX ORDER — ALBUTEROL SULFATE 90 UG/1
2 AEROSOL, METERED RESPIRATORY (INHALATION) EVERY 6 HOURS
Qty: 18 G | Refills: 0 | Status: SHIPPED | OUTPATIENT
Start: 2022-05-12 | End: 2022-12-28

## 2022-05-12 ASSESSMENT — ENCOUNTER SYMPTOMS
CONSTITUTIONAL NEGATIVE: 1
COUGH: 1
NEUROLOGICAL NEGATIVE: 1
PSYCHIATRIC NEGATIVE: 1
CARDIOVASCULAR NEGATIVE: 1
SHORTNESS OF BREATH: 1

## 2022-05-12 NOTE — PROGRESS NOTES
Assessment & Plan     Acute bronchitis due to Rhinovirus  - predniSONE (DELTASONE) 20 MG tablet  Dispense: 10 tablet; Refill: 0  - albuterol (PROAIR HFA/PROVENTIL HFA/VENTOLIN HFA) 108 (90 Base) MCG/ACT inhaler  Dispense: 18 g; Refill: 0     Discussed that this is most likely viral, trial steroid and inhaler. If steroids do not help, fever develops, continually productive cough, may need antibiotic.     Increase fluids with water, Pedialyte, Gatorade, or rehydrating beverages. Alternate Tylenol and Ibuprofen as needed for aches, pains or fever. Rest as much as possible. Use OTC cough and cold medication. Run humidifier at night. Follow up in clinic if symptoms persist or worsen.     Return in about 1 week (around 5/19/2022), or if symptoms worsen or fail to improve.    Subjective     Jahaira is a 66 year old female who presents to clinic today for the following health issues:  Chief Complaint   Patient presents with     Cough     Started about the beginning of May, persistent cough continues. Was given benzonatate capsules on 5/9/2022 with no relief. Covid test was negative.     Jahaira presents with continued cough x 2 weeks. She reports cough is non productive. She did e visit and was given Tessalon and allergy medications as well as Flonase which did not help. She denies history of allergy, no fevers, nasal congestion improved.           Review of Systems   Constitutional: Negative.    HENT: Positive for congestion.    Respiratory: Positive for cough and shortness of breath.    Cardiovascular: Negative.    Neurological: Negative.    Psychiatric/Behavioral: Negative.            Objective    /78 (BP Location: Right arm, Patient Position: Sitting, Cuff Size: Adult Large)   Pulse 98   Temp 98.2  F (36.8  C) (Tympanic)   Resp 18   Wt 79.4 kg (175 lb)   LMP 06/02/2008   SpO2 98%   BMI 32.01 kg/m    Physical Exam  Constitutional:       Appearance: Normal appearance.   HENT:      Head: Normocephalic and  atraumatic.   Eyes:      Extraocular Movements: Extraocular movements intact.      Conjunctiva/sclera: Conjunctivae normal.      Pupils: Pupils are equal, round, and reactive to light.   Cardiovascular:      Rate and Rhythm: Normal rate and regular rhythm.      Heart sounds: Normal heart sounds.   Pulmonary:      Effort: Pulmonary effort is normal.      Breath sounds: Normal breath sounds.   Musculoskeletal:      Cervical back: Normal range of motion and neck supple.   Skin:     General: Skin is warm and dry.   Neurological:      General: No focal deficit present.      Mental Status: She is alert and oriented to person, place, and time.   Psychiatric:         Mood and Affect: Mood normal.         Behavior: Behavior normal.         Thought Content: Thought content normal.         Judgment: Judgment normal.              Luis Boss PA-C

## 2022-11-19 ENCOUNTER — HEALTH MAINTENANCE LETTER (OUTPATIENT)
Age: 67
End: 2022-11-19

## 2022-12-23 ASSESSMENT — ENCOUNTER SYMPTOMS
DIZZINESS: 0
NAUSEA: 0
FEVER: 0
DIARRHEA: 0
DYSURIA: 0
EYE PAIN: 0
HEARTBURN: 0
ARTHRALGIAS: 0
PALPITATIONS: 0
JOINT SWELLING: 0
NERVOUS/ANXIOUS: 0
SORE THROAT: 0
HEADACHES: 0
HEMATOCHEZIA: 0
BREAST MASS: 0
CHILLS: 0
SHORTNESS OF BREATH: 0
CONSTIPATION: 0
WEAKNESS: 0
ABDOMINAL PAIN: 0
FREQUENCY: 0
PARESTHESIAS: 0
MYALGIAS: 0
COUGH: 0
HEMATURIA: 0

## 2022-12-23 ASSESSMENT — ACTIVITIES OF DAILY LIVING (ADL): CURRENT_FUNCTION: NO ASSISTANCE NEEDED

## 2022-12-28 ENCOUNTER — OFFICE VISIT (OUTPATIENT)
Dept: FAMILY MEDICINE | Facility: CLINIC | Age: 67
End: 2022-12-28
Payer: MEDICARE

## 2022-12-28 VITALS
TEMPERATURE: 98.3 F | BODY MASS INDEX: 33.79 KG/M2 | RESPIRATION RATE: 22 BRPM | DIASTOLIC BLOOD PRESSURE: 72 MMHG | WEIGHT: 183.6 LBS | OXYGEN SATURATION: 98 % | SYSTOLIC BLOOD PRESSURE: 116 MMHG | HEART RATE: 64 BPM | HEIGHT: 62 IN

## 2022-12-28 DIAGNOSIS — N18.31 STAGE 3A CHRONIC KIDNEY DISEASE (H): ICD-10-CM

## 2022-12-28 DIAGNOSIS — I10 ESSENTIAL HYPERTENSION: ICD-10-CM

## 2022-12-28 DIAGNOSIS — Z12.11 SCREEN FOR COLON CANCER: ICD-10-CM

## 2022-12-28 DIAGNOSIS — Z00.00 WELCOME TO MEDICARE PREVENTIVE VISIT: Primary | ICD-10-CM

## 2022-12-28 DIAGNOSIS — Z78.0 ASYMPTOMATIC MENOPAUSAL STATE: ICD-10-CM

## 2022-12-28 DIAGNOSIS — Z86.73 HISTORY OF ISCHEMIC STROKE WITHOUT RESIDUAL DEFICITS: ICD-10-CM

## 2022-12-28 DIAGNOSIS — E03.9 ACQUIRED HYPOTHYROIDISM: ICD-10-CM

## 2022-12-28 DIAGNOSIS — Z12.31 VISIT FOR SCREENING MAMMOGRAM: ICD-10-CM

## 2022-12-28 DIAGNOSIS — R73.03 PREDIABETES: ICD-10-CM

## 2022-12-28 DIAGNOSIS — E78.5 DYSLIPIDEMIA: ICD-10-CM

## 2022-12-28 LAB
ANION GAP SERPL CALCULATED.3IONS-SCNC: 10 MMOL/L (ref 7–15)
BUN SERPL-MCNC: 16.8 MG/DL (ref 8–23)
CALCIUM SERPL-MCNC: 9.8 MG/DL (ref 8.8–10.2)
CHLORIDE SERPL-SCNC: 100 MMOL/L (ref 98–107)
CHOLEST SERPL-MCNC: 141 MG/DL
CREAT SERPL-MCNC: 1.07 MG/DL (ref 0.51–0.95)
CREAT UR-MCNC: 133.9 MG/DL
DEPRECATED HCO3 PLAS-SCNC: 29 MMOL/L (ref 22–29)
GFR SERPL CREATININE-BSD FRML MDRD: 57 ML/MIN/1.73M2
GLUCOSE SERPL-MCNC: 117 MG/DL (ref 70–99)
HBA1C MFR BLD: 6.4 % (ref 0–5.6)
HDLC SERPL-MCNC: 57 MG/DL
HGB BLD-MCNC: 13.2 G/DL (ref 11.7–15.7)
LDLC SERPL CALC-MCNC: 47 MG/DL
MICROALBUMIN UR-MCNC: <12 MG/L
MICROALBUMIN/CREAT UR: NORMAL MG/G{CREAT}
NONHDLC SERPL-MCNC: 84 MG/DL
POTASSIUM SERPL-SCNC: 4.5 MMOL/L (ref 3.4–5.3)
SODIUM SERPL-SCNC: 139 MMOL/L (ref 136–145)
T4 FREE SERPL-MCNC: 0.96 NG/DL (ref 0.9–1.7)
TRIGL SERPL-MCNC: 183 MG/DL
TSH SERPL DL<=0.005 MIU/L-ACNC: 14.58 UIU/ML (ref 0.3–4.2)

## 2022-12-28 PROCEDURE — G0402 INITIAL PREVENTIVE EXAM: HCPCS | Performed by: NURSE PRACTITIONER

## 2022-12-28 PROCEDURE — 84443 ASSAY THYROID STIM HORMONE: CPT | Performed by: NURSE PRACTITIONER

## 2022-12-28 PROCEDURE — 82043 UR ALBUMIN QUANTITATIVE: CPT | Performed by: NURSE PRACTITIONER

## 2022-12-28 PROCEDURE — 82570 ASSAY OF URINE CREATININE: CPT | Performed by: NURSE PRACTITIONER

## 2022-12-28 PROCEDURE — 83036 HEMOGLOBIN GLYCOSYLATED A1C: CPT | Performed by: NURSE PRACTITIONER

## 2022-12-28 PROCEDURE — 84439 ASSAY OF FREE THYROXINE: CPT | Performed by: NURSE PRACTITIONER

## 2022-12-28 PROCEDURE — 80048 BASIC METABOLIC PNL TOTAL CA: CPT | Performed by: NURSE PRACTITIONER

## 2022-12-28 PROCEDURE — 36415 COLL VENOUS BLD VENIPUNCTURE: CPT | Performed by: NURSE PRACTITIONER

## 2022-12-28 PROCEDURE — 80061 LIPID PANEL: CPT | Performed by: NURSE PRACTITIONER

## 2022-12-28 PROCEDURE — 85018 HEMOGLOBIN: CPT | Performed by: NURSE PRACTITIONER

## 2022-12-28 PROCEDURE — 99214 OFFICE O/P EST MOD 30 MIN: CPT | Mod: 25 | Performed by: NURSE PRACTITIONER

## 2022-12-28 RX ORDER — METOPROLOL TARTRATE 50 MG
50 TABLET ORAL 2 TIMES DAILY
Qty: 180 TABLET | Refills: 3 | Status: SHIPPED | OUTPATIENT
Start: 2022-12-28 | End: 2023-12-27

## 2022-12-28 RX ORDER — AMLODIPINE BESYLATE 5 MG/1
5 TABLET ORAL DAILY
Qty: 90 TABLET | Refills: 1 | Status: SHIPPED | OUTPATIENT
Start: 2022-12-28 | End: 2023-07-05

## 2022-12-28 RX ORDER — LEVOTHYROXINE SODIUM 50 UG/1
CAPSULE ORAL
Qty: 90 CAPSULE | Refills: 3 | Status: SHIPPED | OUTPATIENT
Start: 2022-12-28 | End: 2023-02-03 | Stop reason: DRUGHIGH

## 2022-12-28 RX ORDER — ROSUVASTATIN CALCIUM 20 MG/1
20 TABLET, COATED ORAL DAILY
Qty: 90 TABLET | Refills: 3 | Status: SHIPPED | OUTPATIENT
Start: 2022-12-28 | End: 2023-12-27

## 2022-12-28 ASSESSMENT — ENCOUNTER SYMPTOMS
NERVOUS/ANXIOUS: 0
HEARTBURN: 0
HEADACHES: 0
WEAKNESS: 0
COUGH: 0
ABDOMINAL PAIN: 0
NAUSEA: 0
PALPITATIONS: 0
MYALGIAS: 0
CONSTIPATION: 0
DIARRHEA: 0
SHORTNESS OF BREATH: 0
DIZZINESS: 0
PARESTHESIAS: 0
HEMATURIA: 0
ARTHRALGIAS: 0
HEMATOCHEZIA: 0
JOINT SWELLING: 0
FREQUENCY: 0
CHILLS: 0
BREAST MASS: 0
EYE PAIN: 0
SORE THROAT: 0
DYSURIA: 0
FEVER: 0

## 2022-12-28 ASSESSMENT — ACTIVITIES OF DAILY LIVING (ADL): CURRENT_FUNCTION: NO ASSISTANCE NEEDED

## 2022-12-28 ASSESSMENT — PAIN SCALES - GENERAL: PAINLEVEL: NO PAIN (0)

## 2022-12-28 NOTE — PATIENT INSTRUCTIONS
(I10) Essential hypertension  Comment: Chronic, stable.  Blood pressures below target goal in office.  Patient notes blood pressures are lower at home as well since she has been retired.  Denies any dizziness headedness or increased fatigue.  We will decrease amlodipine to 5 mg daily and continue current dose of metoprolol.  Patient to schedule a nurse only visit in the next few weeks for a blood pressure recheck.  We will determine any further titration of medications at that time.  Plan: amLODIPine (NORVASC) 5 MG tablet, metoprolol         tartrate (LOPRESSOR) 50 MG tablet          (N18.31) Stage 3a chronic kidney disease (H)  Comment: Chronic, has been stable.  We will recheck labs today and urine.  Plan: BASIC METABOLIC PANEL, Albumin Random Urine         Quantitative with Creat Ratio, Hemoglobin          (Z86.73) History of ischemic stroke without residual deficits  Comment: Chronic, stable.  Continue statin daily without any side effects.  Patient not fasting today, recommend scheduling a lab only appointment when patient can be fasting for 8 to 10 hours for cholesterol recheck.  Continue statin without any changes.  Plan: rosuvastatin (CRESTOR) 20 MG tablet          (E03.9) Acquired hypothyroidism  Comment: Chronic, stable.  Due for TSH recheck, will get labs today and notify patient of results and any further recommendations.  Plan: TSH WITH FREE T4 REFLEX, levothyroxine         (TIROSINT) 50 MCG capsule          (Z78.0) Asymptomatic menopausal state  Comment: Due for DEXA scan.  Orders placed.  Patient can call [phone] to schedule.   Plan: DEXA HIP/PELVIS/SPINE - Future          (E78.5) Dyslipidemia  Comment: Chronic, currently on statin, tolerating well.  Plan: Lipid panel reflex to direct LDL Fasting,         rosuvastatin (CRESTOR) 20 MG tablet          (R73.03) Prediabetes  Comment: History of prediabetes, last A1c 6.0.  We will recheck today.  Plan: Hemoglobin A1c          (Z12.31) Visit for  screening mammogram  Comment: Due for mammogram screening, patient should be receiving phone call to schedule.  If not can call [phone].  Plan: MA SCREENING DIGITAL BILAT - Future  (s+30)          (Z12.11) Screen for colon cancer  Comment: Due for colon cancer screening.  Patient receiving a phone call to schedule colonoscopy.  Plan: Colonoscopy Screening  Referral      Patient Education   Personalized Prevention Plan  You are due for the preventive services outlined below.  Your care team is available to assist you in scheduling these services.  If you have already completed any of these items, please share that information with your care team to update in your medical record.  Health Maintenance Due   Topic Date Due    Osteoporosis Screening  Never done    COVID-19 Vaccine (1) Never done    Zoster (Shingles) Vaccine (1 of 2) Never done    Mammogram  10/12/2009    Colorectal Cancer Screening  03/28/2020    Flu Vaccine (1) Never done    Basic Metabolic Panel  12/24/2022    Cholesterol Lab  12/24/2022    Kidney Microalbumin Urine Test  12/24/2022    Pneumococcal Vaccine (1 - PCV) 09/08/2022    Thyroid Function Lab  12/24/2022    Hemoglobin  12/24/2022

## 2022-12-28 NOTE — PROGRESS NOTES
"SUBJECTIVE:   Jahaira is a 67 year old who presents for Preventive Visit.    Patient has been advised of split billing requirements and indicates understanding: Yes  Are you in the first 12 months of your Medicare coverage?  Yes,  Visual Acuity:  Right Eye: 20/32   Left Eye: 20/32  Both Eyes: 20/32    Healthy Habits:     In general, how would you rate your overall health?  Good    Frequency of exercise:  4-5 days/week    Do you usually eat at least 4 servings of fruit and vegetables a day, include whole grains    & fiber and avoid regularly eating high fat or \"junk\" foods?  Yes    Taking medications regularly:  Yes    Medication side effects:  None    Ability to successfully perform activities of daily living:  No assistance needed    Home Safety:  No safety concerns identified    Hearing Impairment:  No hearing concerns    In the past 6 months, have you been bothered by leaking of urine?  No    In general, how would you rate your overall mental or emotional health?  Excellent      PHQ-2 Total Score: 0    Additional concerns today:  No      Have you ever done Advance Care Planning? (For example, a Health Directive, POLST, or a discussion with a medical provider or your loved ones about your wishes): Yes, advance care planning is on file.       Fall risk  Fallen 2 or more times in the past year?: No  Any fall with injury in the past year?: No    Cognitive Screening   1) Repeat 3 items (Leader, Season, Table)    2) Clock draw: NORMAL  3) 3 item recall: Recalls 3 objects  Results: 3 items recalled: COGNITIVE IMPAIRMENT LESS LIKELY    Mini-CogTM Copyright ROMEO Shahid. Licensed by the author for use in [facility]; reprinted with permission (saeed@.[email or web address]). All rights reserved.      Do you have sleep apnea, excessive snoring or daytime drowsiness?: no    Reviewed and updated as needed this visit by clinical staff   Tobacco  Allergies  Meds  Problems  Med Hx  Surg Hx  Fam Hx          Reviewed and updated as " needed this visit by Provider   Tobacco  Allergies  Meds  Problems  Med Hx  Surg Hx  Fam Hx         Social History     Tobacco Use     Smoking status: Never     Smokeless tobacco: Never   Substance Use Topics     Alcohol use: Yes     Comment: minimal     If you drink alcohol do you typically have >3 drinks per day or >7 drinks per week? No    Alcohol Use 12/28/2022   Prescreen: >3 drinks/day or >7 drinks/week? -   Prescreen: >3 drinks/day or >7 drinks/week? No           Current providers sharing in care for this patient include:   Patient Care Team:  Brionna Gonzalez PA-C as PCP - General (Physician Assistant)  Reza Mckeon MD as MD (Neurology)  Deanna Frankel MD as Resident (INTERNAL MEDICINE - ENDOCRINOLOGY, DIABETES & METABOLISM)  Trini Jackson APRN CNP as Assigned PCP    The following health maintenance items are reviewed in Epic and correct as of today:  Health Maintenance   Topic Date Due     DEXA  Never done     COVID-19 Vaccine (1) Never done     ZOSTER IMMUNIZATION (1 of 2) Never done     MAMMO SCREENING  10/12/2009     COLORECTAL CANCER SCREENING  03/28/2020     INFLUENZA VACCINE (1) Never done     PHQ-2 (once per calendar year)  01/01/2023     Pneumococcal Vaccine: 65+ Years (1 - PCV) 09/08/2022     MEDICARE ANNUAL WELLNESS VISIT  12/28/2023     BMP  12/28/2023     LIPID  12/28/2023     MICROALBUMIN  12/28/2023     TSH W/FREE T4 REFLEX  12/28/2023     ANNUAL REVIEW OF HM ORDERS  12/28/2023     FALL RISK ASSESSMENT  12/28/2023     HEMOGLOBIN  12/28/2023     ADVANCE CARE PLANNING  12/28/2027     DTAP/TDAP/TD IMMUNIZATION (3 - Td or Tdap) 11/16/2028     HEPATITIS C SCREENING  Completed     HIV SCREENING  Completed     URINALYSIS  Completed     IPV IMMUNIZATION  Aged Out     MENINGITIS IMMUNIZATION  Aged Out     PAP  Discontinued     Lab work is in process  Labs reviewed in EPIC  BP Readings from Last 3 Encounters:   12/28/22 116/72   05/12/22 130/78    21 132/70    Wt Readings from Last 3 Encounters:   22 83.3 kg (183 lb 9.6 oz)   22 79.4 kg (175 lb)   21 84.4 kg (186 lb)                  Patient Active Problem List   Diagnosis     Essential hypertension     Acquired hypothyroidism     Excessive or frequent menstruation     Acute ischemic stroke (H)     Prediabetes     CKD (chronic kidney disease) stage 3, GFR 30-59 ml/min (H)     Dyslipidemia     Past Surgical History:   Procedure Laterality Date     ABDOMEN SURGERY  1995         SURGICAL HISTORY OF -   1995           Social History     Tobacco Use     Smoking status: Never     Smokeless tobacco: Never   Substance Use Topics     Alcohol use: Yes     Comment: minimal     Family History   Problem Relation Age of Onset     Arthritis Mother      Osteoporosis Mother         elder     C.A.D. Father         By pass   at 72     Leukemia Father      Diabetes Maternal Grandmother         type 2     Cerebrovascular Disease Paternal Grandmother         at older age     Cardiovascular Paternal Grandfather         MI early 70's         Current Outpatient Medications   Medication Sig Dispense Refill     amLODIPine (NORVASC) 5 MG tablet Take 1 tablet (5 mg) by mouth daily TAKE 1 TABLET (10 MG) BY MOUTH DAILY 90 tablet 1     aspirin  MG tablet Take 1 tablet (325 mg) by mouth daily For stroke prevention.       levothyroxine (TIROSINT) 50 MCG capsule TAKE ONE CAPSULE BY MOUTH ONCE DAILY 90 capsule 3     loperamide (IMODIUM) 2 MG capsule Take 2 mg by mouth 4 times daily as needed for diarrhea       metoprolol tartrate (LOPRESSOR) 50 MG tablet Take 1 tablet (50 mg) by mouth 2 times daily 180 tablet 3     rosuvastatin (CRESTOR) 20 MG tablet Take 1 tablet (20 mg) by mouth daily 90 tablet 3     levothyroxine (TIROSINT) 25 MCG capsule Take 25 mcg by mouth daily In addition to 25 mcg 30 capsule 1     No Known Allergies  Mammogram Screening: Mammogram Screening: Recommended  "mammography every 1-2 years with patient discussion and risk factor consideration    Breast CA Risk Assessment (FHS-7) 12/19/2021   Do you have a family history of breast, colon, or ovarian cancer? No / Unknown       Mammogram Screening: Recommended mammography every 1-2 years with patient discussion and risk factor consideration  Pertinent mammograms are reviewed under the imaging tab.    Review of Systems   Constitutional: Negative for chills and fever.   HENT: Negative for congestion, ear pain, hearing loss and sore throat.    Eyes: Negative for pain and visual disturbance.   Respiratory: Negative for cough and shortness of breath.    Cardiovascular: Negative for chest pain, palpitations and peripheral edema.   Gastrointestinal: Negative for abdominal pain, constipation, diarrhea, heartburn, hematochezia and nausea.   Breasts:  Negative for tenderness, breast mass and discharge.   Genitourinary: Negative for dysuria, frequency, genital sores, hematuria, pelvic pain, urgency, vaginal bleeding and vaginal discharge.   Musculoskeletal: Negative for arthralgias, joint swelling and myalgias.   Skin: Negative for rash.   Neurological: Negative for dizziness, weakness, headaches and paresthesias.   Psychiatric/Behavioral: Negative for mood changes. The patient is not nervous/anxious.        OBJECTIVE:   /72 (BP Location: Right arm, Patient Position: Sitting, Cuff Size: Adult Regular)   Pulse 64   Temp 98.3  F (36.8  C) (Tympanic)   Resp 22   Ht 1.568 m (5' 1.75\")   Wt 83.3 kg (183 lb 9.6 oz)   LMP 06/02/2008   SpO2 98%   BMI 33.85 kg/m   Estimated body mass index is 33.85 kg/m  as calculated from the following:    Height as of this encounter: 1.568 m (5' 1.75\").    Weight as of this encounter: 83.3 kg (183 lb 9.6 oz).  Physical Exam  GENERAL APPEARANCE: healthy, alert and no distress  EYES: Eyes grossly normal to inspection, PERRL and conjunctivae and sclerae normal  HENT: ear canals and TM's normal, nose " and mouth without ulcers or lesions, oropharynx clear and oral mucous membranes moist  NECK: no adenopathy, no asymmetry, masses, or scars and thyroid normal to palpation  RESP: lungs clear to auscultation - no rales, rhonchi or wheezes  BREAST: normal without masses, tenderness or nipple discharge and no palpable axillary masses or adenopathy  CV: regular rate and rhythm, normal S1 S2, no S3 or S4, no murmur, click or rub, no peripheral edema and peripheral pulses strong  ABDOMEN: soft, nontender, no hepatosplenomegaly, no masses and bowel sounds normal   (female): normal female external genitalia, normal urethral meatus, vaginal mucosal atrophy noted, normal cervix, adnexae, and uterus without masses or abnormal discharge  MS: no musculoskeletal defects are noted and gait is age appropriate without ataxia  SKIN: no suspicious lesions or rashes  NEURO: Normal strength and tone, sensory exam grossly normal, mentation intact and speech normal  PSYCH: mentation appears normal and affect normal/bright    Diagnostic Test Results:  Labs reviewed in Epic  Pending     ASSESSMENT / PLAN:   (Z00.00) Welcome to Medicare preventive visit  (primary encounter diagnosis)  Comment: Patient returns to clinic for annual preventative visit/Medicare.    (I10) Essential hypertension  Comment: Chronic, stable.  Blood pressures below target goal in office.  Patient notes blood pressures are lower at home as well since she has been retired.  Denies any dizziness headedness or increased fatigue.  We will decrease amlodipine to 5 mg daily and continue current dose of metoprolol.  Patient to schedule a nurse only visit in the next few weeks for a blood pressure recheck.  We will determine any further titration of medications at that time.  Plan: amLODIPine (NORVASC) 5 MG tablet, metoprolol         tartrate (LOPRESSOR) 50 MG tablet          (N18.31) Stage 3a chronic kidney disease (H)  Comment: Chronic, has been stable.  We will recheck  labs today and urine.  Plan: BASIC METABOLIC PANEL, Albumin Random Urine         Quantitative with Creat Ratio, Hemoglobin          (Z86.73) History of ischemic stroke without residual deficits  Comment: Chronic, stable.  Continue statin daily without any side effects.  Patient not fasting today, recommend scheduling a lab only appointment when patient can be fasting for 8 to 10 hours for cholesterol recheck.  Continue statin without any changes.  Plan: rosuvastatin (CRESTOR) 20 MG tablet          (E03.9) Acquired hypothyroidism  Comment: Chronic, stable.  Due for TSH recheck, will get labs today and notify patient of results and any further recommendations.  Plan: TSH WITH FREE T4 REFLEX, levothyroxine         (TIROSINT) 50 MCG capsule          (Z78.0) Asymptomatic menopausal state  Comment: Due for DEXA scan.  Orders placed.  Patient can call [phone] to schedule.   Plan: DEXA HIP/PELVIS/SPINE - Future          (E78.5) Dyslipidemia  Comment: Chronic, currently on statin, tolerating well.  Plan: Lipid panel reflex to direct LDL Fasting,         rosuvastatin (CRESTOR) 20 MG tablet          (R73.03) Prediabetes  Comment: History of prediabetes, last A1c 6.0.  We will recheck today.  Plan: Hemoglobin A1c          (Z12.31) Visit for screening mammogram  Comment: Due for mammogram screening, patient should be receiving phone call to schedule.  If not can call [phone].  Plan: MA SCREENING DIGITAL BILAT - Future  (s+30)          (Z12.11) Screen for colon cancer  Comment: Due for colon cancer screening.  Patient receiving a phone call to schedule colonoscopy.  Plan: Colonoscopy Screening  Referral            Patient has been advised of split billing requirements and indicates understanding: Yes      COUNSELING:  Reviewed preventive health counseling, as reflected in patient instructions        She reports that she has never smoked. She has never used smokeless tobacco.      Appropriate preventive  services were discussed with this patient, including applicable screening as appropriate for cardiovascular disease, diabetes, osteopenia/osteoporosis, and glaucoma.  As appropriate for age/gender, discussed screening for colorectal cancer, prostate cancer, breast cancer, and cervical cancer. Checklist reviewing preventive services available has been given to the patient.    Reviewed patients plan of care and provided an AVS. The Basic Care Plan (routine screening as documented in Health Maintenance) for Hilda meets the Care Plan requirement. This Care Plan has been established and reviewed with the Patient.          Trini Jackson DNP, APRN-CNP   [facility]    Identified Health Risks:    Chart documentation with Dragon Voice recognition Software. Although reviewed after completion, some words and grammatical errors may remain.

## 2023-01-03 DIAGNOSIS — E03.9 ACQUIRED HYPOTHYROIDISM: ICD-10-CM

## 2023-01-03 RX ORDER — LEVOTHYROXINE SODIUM 25 UG/1
25 CAPSULE ORAL DAILY
Status: CANCELLED | OUTPATIENT
Start: 2023-01-03

## 2023-01-04 ENCOUNTER — MYC MEDICAL ADVICE (OUTPATIENT)
Dept: FAMILY MEDICINE | Facility: CLINIC | Age: 68
End: 2023-01-04

## 2023-01-04 DIAGNOSIS — E03.9 ACQUIRED HYPOTHYROIDISM: Primary | ICD-10-CM

## 2023-01-07 RX ORDER — LEVOTHYROXINE SODIUM 25 UG/1
25 CAPSULE ORAL DAILY
Qty: 30 CAPSULE | Refills: 1 | Status: SHIPPED | OUTPATIENT
Start: 2023-01-07 | End: 2023-02-03

## 2023-01-25 ENCOUNTER — DOCUMENTATION ONLY (OUTPATIENT)
Dept: LAB | Facility: CLINIC | Age: 68
End: 2023-01-25
Payer: MEDICARE

## 2023-01-25 DIAGNOSIS — E03.9 ACQUIRED HYPOTHYROIDISM: Primary | ICD-10-CM

## 2023-01-25 NOTE — PROGRESS NOTES
Hilda JERALD Dakotah has an upcoming lab appointment:    Future Appointments   Date Time Provider Department Center   2/2/2023  1:45 PM [facility]   2/15/2023  1:00 PM [facility]   2/15/2023  1:30 PM [facility]     Patient is scheduled for the following lab(s): no scheduling notes but Marj wants patient to return around 2/28/23 for repeat thyroid. Patient is coming in on 2/2/23 and there are no orders. Please place orders or call patient to cancel appt.     There is no order available. Please review and place either future orders or HMPO (Review of Health Maintenance Protocol Orders), as appropriate.    There are no preventive care reminders to display for this patient.  Jossie Camacho

## 2023-02-02 ENCOUNTER — LAB (OUTPATIENT)
Dept: LAB | Facility: CLINIC | Age: 68
End: 2023-02-02
Payer: MEDICARE

## 2023-02-02 DIAGNOSIS — E03.9 ACQUIRED HYPOTHYROIDISM: ICD-10-CM

## 2023-02-02 LAB — TSH SERPL DL<=0.005 MIU/L-ACNC: 2.92 UIU/ML (ref 0.3–4.2)

## 2023-02-02 PROCEDURE — 84443 ASSAY THYROID STIM HORMONE: CPT

## 2023-02-02 PROCEDURE — 36415 COLL VENOUS BLD VENIPUNCTURE: CPT

## 2023-02-03 DIAGNOSIS — E03.9 ACQUIRED HYPOTHYROIDISM: ICD-10-CM

## 2023-02-03 RX ORDER — LEVOTHYROXINE SODIUM 75 UG/1
75 CAPSULE ORAL DAILY
Qty: 90 CAPSULE | Refills: 3 | Status: SHIPPED | OUTPATIENT
Start: 2023-02-03 | End: 2023-12-27

## 2023-02-03 NOTE — RESULT ENCOUNTER NOTE
Jahaira  Thyroid lab now looks great.  It looks like Trini had added a 25 mcg dose to the 50 mcg for a total of 75 mcg.  Let me know if this is not correct dose.  I just sent a simplified prescription of 75 mcg in a single pill.  Let me know if you have questions.  Hope you're doing well.  Brionna

## 2023-02-04 ENCOUNTER — MYC MEDICAL ADVICE (OUTPATIENT)
Dept: FAMILY MEDICINE | Facility: CLINIC | Age: 68
End: 2023-02-04
Payer: MEDICARE

## 2023-02-04 DIAGNOSIS — E03.9 ACQUIRED HYPOTHYROIDISM: Primary | ICD-10-CM

## 2023-02-06 RX ORDER — LEVOTHYROXINE SODIUM 25 UG/1
25 CAPSULE ORAL DAILY
Qty: 60 CAPSULE | Refills: 0 | Status: SHIPPED | OUTPATIENT
Start: 2023-02-06 | End: 2023-08-03

## 2023-02-08 ENCOUNTER — TELEPHONE (OUTPATIENT)
Dept: FAMILY MEDICINE | Facility: CLINIC | Age: 68
End: 2023-02-08
Payer: MEDICARE

## 2023-02-08 NOTE — TELEPHONE ENCOUNTER
Jahaira just picked up her 75 mcg Tirosint capsules. She still has 2 boxes of 50 mcg capsules to use up. Can we get a new rx for #60 of the 25 mcg Tirosint to finsh the 50's she has at home?      Thank You,  Ayleen Vizcaino, [facility], [place]

## 2023-02-09 NOTE — TELEPHONE ENCOUNTER
levothyroxine (TIROSINT) 25 MCG capsule 60 capsule 0 2/6/2023  --   Sig - Route: Take 25 mcg by mouth daily Take with 50 mcg tab for total daily dose of 75 mcg - Oral   Sent to pharmacy as: Levothyroxine Sodium 25 MCG Oral Capsule (TIROSINT)     Pharm informed of above order.   PANCHO Torres RN

## 2023-02-15 ENCOUNTER — HOSPITAL ENCOUNTER (OUTPATIENT)
Dept: BONE DENSITY | Facility: CLINIC | Age: 68
Discharge: HOME | End: 2023-02-15
Attending: NURSE PRACTITIONER
Payer: MEDICARE

## 2023-02-15 ENCOUNTER — HOSPITAL ENCOUNTER (OUTPATIENT)
Dept: MAMMOGRAPHY | Facility: CLINIC | Age: 68
Discharge: HOME | End: 2023-02-15
Attending: NURSE PRACTITIONER
Payer: MEDICARE

## 2023-02-15 DIAGNOSIS — Z12.31 VISIT FOR SCREENING MAMMOGRAM: ICD-10-CM

## 2023-02-15 DIAGNOSIS — Z78.0 ASYMPTOMATIC MENOPAUSAL STATE: ICD-10-CM

## 2023-02-15 PROCEDURE — 77080 DXA BONE DENSITY AXIAL: CPT

## 2023-02-15 PROCEDURE — 77067 SCR MAMMO BI INCL CAD: CPT

## 2023-02-17 DIAGNOSIS — R92.8 ABNORMAL MAMMOGRAM: Primary | ICD-10-CM

## 2023-03-16 ENCOUNTER — HOSPITAL ENCOUNTER (OUTPATIENT)
Dept: MAMMOGRAPHY | Facility: CLINIC | Age: 68
Discharge: HOME | End: 2023-03-16
Attending: NURSE PRACTITIONER
Payer: MEDICARE

## 2023-03-16 ENCOUNTER — HOSPITAL ENCOUNTER (OUTPATIENT)
Dept: ULTRASOUND IMAGING | Facility: CLINIC | Age: 68
Discharge: HOME | End: 2023-03-16
Attending: NURSE PRACTITIONER
Payer: MEDICARE

## 2023-03-16 DIAGNOSIS — R92.8 ABNORMAL MAMMOGRAM: ICD-10-CM

## 2023-03-16 PROCEDURE — 76642 ULTRASOUND BREAST LIMITED: CPT | Mod: LT

## 2023-03-16 PROCEDURE — 77066 DX MAMMO INCL CAD BI: CPT

## 2023-07-05 DIAGNOSIS — I10 ESSENTIAL HYPERTENSION: ICD-10-CM

## 2023-07-05 RX ORDER — AMLODIPINE BESYLATE 5 MG/1
TABLET ORAL
Qty: 90 TABLET | Refills: 1 | Status: SHIPPED | OUTPATIENT
Start: 2023-07-05 | End: 2023-12-27 | Stop reason: ALTCHOICE

## 2023-07-05 NOTE — TELEPHONE ENCOUNTER
Spoke with patient who states she has been checking her BP at home readings are 126/72/ 113/69 and 131/70.  Update sent to covering provider along with refill request.    Routing refill request to provider for review/approval because:  Labs out of range:    Creatinine   Date Value Ref Range Status   12/28/2022 1.07 (H) 0.51 - 0.95 mg/dL Final   11/12/2020 0.96 0.52 - 1.04 mg/dL Final             Julie Behrendt RN

## 2023-08-03 ENCOUNTER — OFFICE VISIT (OUTPATIENT)
Dept: FAMILY MEDICINE | Facility: CLINIC | Age: 68
End: 2023-08-03
Payer: MEDICARE

## 2023-08-03 ENCOUNTER — MYC MEDICAL ADVICE (OUTPATIENT)
Dept: FAMILY MEDICINE | Facility: CLINIC | Age: 68
End: 2023-08-03

## 2023-08-03 ENCOUNTER — ANCILLARY PROCEDURE (OUTPATIENT)
Dept: GENERAL RADIOLOGY | Facility: CLINIC | Age: 68
End: 2023-08-03
Attending: PHYSICIAN ASSISTANT
Payer: MEDICARE

## 2023-08-03 VITALS
OXYGEN SATURATION: 99 % | WEIGHT: 180 LBS | HEART RATE: 61 BPM | DIASTOLIC BLOOD PRESSURE: 68 MMHG | BODY MASS INDEX: 33.13 KG/M2 | SYSTOLIC BLOOD PRESSURE: 134 MMHG | RESPIRATION RATE: 16 BRPM | HEIGHT: 62 IN | TEMPERATURE: 97.8 F

## 2023-08-03 DIAGNOSIS — Z12.11 SCREEN FOR COLON CANCER: ICD-10-CM

## 2023-08-03 DIAGNOSIS — Z12.11 COLON CANCER SCREENING: Primary | ICD-10-CM

## 2023-08-03 DIAGNOSIS — N18.31 STAGE 3A CHRONIC KIDNEY DISEASE (H): ICD-10-CM

## 2023-08-03 DIAGNOSIS — S69.92XA INJURY OF LEFT THUMB, INITIAL ENCOUNTER: ICD-10-CM

## 2023-08-03 DIAGNOSIS — S69.92XA INJURY OF LEFT THUMB, INITIAL ENCOUNTER: Primary | ICD-10-CM

## 2023-08-03 PROCEDURE — 73140 X-RAY EXAM OF FINGER(S): CPT | Mod: TC | Performed by: RADIOLOGY

## 2023-08-03 PROCEDURE — 99213 OFFICE O/P EST LOW 20 MIN: CPT | Performed by: PHYSICIAN ASSISTANT

## 2023-08-03 RX ORDER — ASPIRIN 81 MG/1
81 TABLET ORAL DAILY
COMMUNITY
Start: 2023-08-03

## 2023-08-03 ASSESSMENT — PAIN SCALES - GENERAL: PAINLEVEL: MILD PAIN (2)

## 2023-08-03 NOTE — PROGRESS NOTES
Assessment & Plan     Injury of left thumb, initial encounter  Discussed could trial splint v refer to ortho, though no obvious laxity.  She decides to live with symptoms.  - XR Finger Left G/E 2 Views    Screen for colon cancer  Message delayed-send for update on her colon cancer screen    Stage 3a chronic kidney disease (H)  Labs up to date    History stroke.    Med list states aspirin 325.  She thinks taking 81 mg.  Asked her to confirm this, discussed increased risk of higher strength aspirin.    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        8/3/2023    10:45 AM   Additional Questions   Roomed by lino mclean cma       History of Present Illness       Reason for visit:  Lrft thumb injury  Symptom onset:  More than a month  Symptoms include:  Some pain and popping  Symptom intensity:  Mild  Symptom progression:  Staying the same  Had these symptoms before:  No  What makes it worse:  Using pressure    She eats 2-3 servings of fruits and vegetables daily.She consumes 1 sweetened beverage(s) daily.She exercises with enough effort to increase her heart rate 30 to 60 minutes per day.  She exercises with enough effort to increase her heart rate 5 days per week.   She is taking medications regularly.     Over 2 months ago hurt thumb.  Can't recall if this was during gardening or playing tug of war with dogs but woke up with it hurting the next day.  Pain and popping have improved.  Still sometimes with use will get pain in MCP joint.    Was planning colonoscopy but  starting new prostate cancer treatment next week, wants to see how that goes.  Last screen was FIT in 2019.        Objective    LMP 06/02/2008   There is no height or weight on file to calculate BMI.  Physical Exam   Normal ROM.  No reproducible laxity on exam.  Xray without fracture or  dislocation.

## 2023-08-03 NOTE — NURSING NOTE
"No chief complaint on file.      Initial LMP 06/02/2008  Estimated body mass index is 33.85 kg/m  as calculated from the following:    Height as of 12/28/22: 1.568 m (5' 1.75\").    Weight as of 12/28/22: 83.3 kg (183 lb 9.6 oz).    Patient presents to the clinic using No DME    Is there anyone who you would like to be able to receive your results? No  If yes have patient fill out REMEDIOS      "

## 2023-10-03 ENCOUNTER — HOSPITAL ENCOUNTER (OUTPATIENT)
Dept: MAMMOGRAPHY | Facility: CLINIC | Age: 68
Discharge: HOME | End: 2023-10-03
Attending: PHYSICIAN ASSISTANT
Payer: MEDICARE

## 2023-10-03 ENCOUNTER — HOSPITAL ENCOUNTER (OUTPATIENT)
Dept: ULTRASOUND IMAGING | Facility: CLINIC | Age: 68
Discharge: HOME | End: 2023-10-03
Attending: PHYSICIAN ASSISTANT
Payer: MEDICARE

## 2023-10-03 DIAGNOSIS — R92.8 CATEGORY 3 MAMMOGRAPHY RESULT WITH SHORT FOLLOW-UP INTERVAL SUGGESTED FOR PROBABLY BENIGN FINDING: ICD-10-CM

## 2023-10-03 PROCEDURE — 76642 ULTRASOUND BREAST LIMITED: CPT | Mod: LT

## 2023-10-03 PROCEDURE — 77061 BREAST TOMOSYNTHESIS UNI: CPT | Mod: LT

## 2023-12-20 ENCOUNTER — ANESTHESIA EVENT (OUTPATIENT)
Dept: GASTROENTEROLOGY | Facility: CLINIC | Age: 68
End: 2023-12-20
Payer: MEDICARE

## 2023-12-20 ASSESSMENT — ENCOUNTER SYMPTOMS
HEMATURIA: 0
HEARTBURN: 0
EYE PAIN: 0
DYSURIA: 0
MYALGIAS: 0
DIARRHEA: 0
HEMATOCHEZIA: 0
CONSTIPATION: 0
ABDOMINAL PAIN: 0
PARESTHESIAS: 0
NAUSEA: 0
SORE THROAT: 0
ARTHRALGIAS: 0
BREAST MASS: 0
SHORTNESS OF BREATH: 0
WEAKNESS: 0
DIZZINESS: 0
NERVOUS/ANXIOUS: 0
FREQUENCY: 0
HEADACHES: 0
FEVER: 0
CHILLS: 0
COUGH: 0
PALPITATIONS: 0
JOINT SWELLING: 0

## 2023-12-20 ASSESSMENT — ACTIVITIES OF DAILY LIVING (ADL): CURRENT_FUNCTION: NO ASSISTANCE NEEDED

## 2023-12-20 NOTE — ANESTHESIA PREPROCEDURE EVALUATION
Anesthesia Pre-Procedure Evaluation    Patient: Hilda Claire   MRN: [id number] : 1955        Procedure : Procedure(s):  Colonoscopy          Past Medical History:   Diagnosis Date    Cerebral infarction (H)     Excessive or frequent menstruation 10/03/2007    10/3/2007: last PAP with some endometrial cells.  On US uterus and pelvis looks OK.  She reports some continuing menses on a regular basis q 4 weeks since last visit, no intermenstrual flow, LMP=.  Her endometrium is 6 mm which is OK pre-menopausal approaching menopause.  No further workup unless irregular bleeding.    Hypertension     Hypothyroidism       Past Surgical History:   Procedure Laterality Date    ABDOMEN SURGERY  1995        BIOPSY BREAST      SURGICAL HISTORY OF -   1995          No Known Allergies   Social History     Tobacco Use    Smoking status: Never    Smokeless tobacco: Never   Substance Use Topics    Alcohol use: Yes     Comment: minimal      Wt Readings from Last 1 Encounters:   23 81.6 kg (180 lb)        Anesthesia Evaluation            ROS/MED HX  ENT/Pulmonary:       Neurologic:     (+)          CVA,    TIA,                  Cardiovascular:     (+) Dyslipidemia hypertension- -   -  - -                                      METS/Exercise Tolerance:     Hematologic:       Musculoskeletal:       GI/Hepatic:       Renal/Genitourinary:     (+) renal disease, type: CRI,            Endo:     (+)          thyroid problem, hypothyroidism,           Psychiatric/Substance Use:       Infectious Disease:       Malignancy:       Other:          Physical Exam    Airway  airway exam normal      Mallampati: II   TM distance: > 3 FB   Neck ROM: full   Mouth opening: > 3 cm    Respiratory Devices and Support         Dental  no notable dental history     (+) Minor Abnormalities - some fillings, tiny chips      Cardiovascular   cardiovascular exam normal          Pulmonary   pulmonary exam normal                 OUTSIDE LABS:  CBC:   Lab Results   Component Value Date    WBC 6.1 01/11/2016    WBC 9.3 11/12/2015    HGB 13.2 12/28/2022    HGB 13.5 12/24/2021    HCT 40.7 01/11/2016    HCT 38.5 11/12/2015     01/11/2016     11/12/2015     BMP:   Lab Results   Component Value Date     12/28/2022     12/24/2021    POTASSIUM 4.5 12/28/2022    POTASSIUM 4.1 12/24/2021    CHLORIDE 100 12/28/2022    CHLORIDE 104 12/24/2021    CO2 29 12/28/2022    CO2 27 12/24/2021    BUN 16.8 12/28/2022    BUN 17 12/24/2021    CR 1.07 (H) 12/28/2022    CR 1.07 (H) 12/24/2021     (H) 12/28/2022     (H) 12/24/2021     COAGS:   Lab Results   Component Value Date    PTT 29 11/11/2015    PTT 29 11/11/2015    INR 0.93 11/11/2015    INR 0.92 11/11/2015     POC:   Lab Results   Component Value Date     (H) 11/15/2015     HEPATIC:   Lab Results   Component Value Date    ALBUMIN 4.3 01/11/2016    PROTTOTAL 7.7 01/11/2016    ALT 87 (H) 01/11/2016    AST 42 01/11/2016    ALKPHOS 90 01/11/2016    BILITOTAL 0.5 01/11/2016     OTHER:   Lab Results   Component Value Date    A1C 6.4 (H) 12/28/2022    JOEL 9.8 12/28/2022    TSH 2.92 02/02/2023    T4 0.96 12/28/2022    T3 79 01/21/2016       Anesthesia Plan    ASA Status:  2    NPO Status:  NPO Appropriate    Anesthesia Type: General.   Induction: Propofol, Intravenous.   Maintenance: TIVA.        Consents    Anesthesia Plan(s) and associated risks, benefits, and realistic alternatives discussed. Questions answered and patient/representative(s) expressed understanding.     - Discussed: Risks, Benefits and Alternatives for BOTH SEDATION and the PROCEDURE were discussed     - Discussed with:  Patient            Postoperative Care    Pain management: Multi-modal analgesia, IV analgesics, Oral pain medications.   PONV prophylaxis: Ondansetron (or other 5HT-3), Dexamethasone or Solumedrol, Background Propofol Infusion     Comments:               Domitila Adams,  APRN CRNA    I have reviewed the pertinent notes and labs in the chart from the past 30 days and (re)examined the patient.  Any updates or changes from those notes are reflected in this note.

## 2023-12-22 ENCOUNTER — ANESTHESIA (OUTPATIENT)
Dept: GASTROENTEROLOGY | Facility: CLINIC | Age: 68
End: 2023-12-22
Payer: MEDICARE

## 2023-12-22 ENCOUNTER — HOSPITAL ENCOUNTER (OUTPATIENT)
Facility: CLINIC | Age: 68
Discharge: HOME | End: 2023-12-22
Attending: SURGERY | Admitting: SURGERY
Payer: MEDICARE

## 2023-12-22 VITALS
OXYGEN SATURATION: 99 % | HEIGHT: 62 IN | BODY MASS INDEX: 31.28 KG/M2 | DIASTOLIC BLOOD PRESSURE: 67 MMHG | HEART RATE: 63 BPM | TEMPERATURE: 97.5 F | RESPIRATION RATE: 14 BRPM | SYSTOLIC BLOOD PRESSURE: 140 MMHG | WEIGHT: 170 LBS

## 2023-12-22 LAB — COLONOSCOPY: NORMAL

## 2023-12-22 PROCEDURE — G0121 COLON CA SCRN NOT HI RSK IND: HCPCS | Performed by: SURGERY

## 2023-12-22 PROCEDURE — 258N000003 HC RX IP 258 OP 636: Performed by: NURSE ANESTHETIST, CERTIFIED REGISTERED

## 2023-12-22 PROCEDURE — 250N000011 HC RX IP 250 OP 636: Performed by: NURSE ANESTHETIST, CERTIFIED REGISTERED

## 2023-12-22 PROCEDURE — 250N000009 HC RX 250: Performed by: NURSE ANESTHETIST, CERTIFIED REGISTERED

## 2023-12-22 PROCEDURE — 370N000017 HC ANESTHESIA TECHNICAL FEE, PER MIN: Performed by: SURGERY

## 2023-12-22 PROCEDURE — 45378 DIAGNOSTIC COLONOSCOPY: CPT | Performed by: SURGERY

## 2023-12-22 RX ORDER — LIDOCAINE HYDROCHLORIDE 20 MG/ML
INJECTION, SOLUTION INFILTRATION; PERINEURAL PRN
Status: DISCONTINUED | OUTPATIENT
Start: 2023-12-22 | End: 2023-12-22

## 2023-12-22 RX ORDER — SODIUM CHLORIDE, SODIUM LACTATE, POTASSIUM CHLORIDE, CALCIUM CHLORIDE 600; 310; 30; 20 MG/100ML; MG/100ML; MG/100ML; MG/100ML
INJECTION, SOLUTION INTRAVENOUS CONTINUOUS
Status: DISCONTINUED | OUTPATIENT
Start: 2023-12-22 | End: 2023-12-22 | Stop reason: HOSPADM

## 2023-12-22 RX ORDER — NALOXONE HYDROCHLORIDE 0.4 MG/ML
0.2 INJECTION, SOLUTION INTRAMUSCULAR; INTRAVENOUS; SUBCUTANEOUS
Status: DISCONTINUED | OUTPATIENT
Start: 2023-12-22 | End: 2023-12-22 | Stop reason: HOSPADM

## 2023-12-22 RX ORDER — FENTANYL CITRATE 50 UG/ML
25 INJECTION, SOLUTION INTRAMUSCULAR; INTRAVENOUS
Status: DISCONTINUED | OUTPATIENT
Start: 2023-12-22 | End: 2023-12-22 | Stop reason: HOSPADM

## 2023-12-22 RX ORDER — FLUMAZENIL 0.1 MG/ML
0.2 INJECTION, SOLUTION INTRAVENOUS
Status: DISCONTINUED | OUTPATIENT
Start: 2023-12-22 | End: 2023-12-22 | Stop reason: HOSPADM

## 2023-12-22 RX ORDER — PROCHLORPERAZINE MALEATE 5 MG
5 TABLET ORAL EVERY 6 HOURS PRN
Status: DISCONTINUED | OUTPATIENT
Start: 2023-12-22 | End: 2023-12-22 | Stop reason: HOSPADM

## 2023-12-22 RX ORDER — NALOXONE HYDROCHLORIDE 0.4 MG/ML
0.4 INJECTION, SOLUTION INTRAMUSCULAR; INTRAVENOUS; SUBCUTANEOUS
Status: DISCONTINUED | OUTPATIENT
Start: 2023-12-22 | End: 2023-12-22 | Stop reason: HOSPADM

## 2023-12-22 RX ORDER — LIDOCAINE 40 MG/G
CREAM TOPICAL
Status: DISCONTINUED | OUTPATIENT
Start: 2023-12-22 | End: 2023-12-22 | Stop reason: HOSPADM

## 2023-12-22 RX ORDER — PROPOFOL 10 MG/ML
INJECTION, EMULSION INTRAVENOUS PRN
Status: DISCONTINUED | OUTPATIENT
Start: 2023-12-22 | End: 2023-12-22

## 2023-12-22 RX ORDER — ONDANSETRON 2 MG/ML
4 INJECTION INTRAMUSCULAR; INTRAVENOUS EVERY 6 HOURS PRN
Status: DISCONTINUED | OUTPATIENT
Start: 2023-12-22 | End: 2023-12-22 | Stop reason: HOSPADM

## 2023-12-22 RX ORDER — DEXAMETHASONE SODIUM PHOSPHATE 4 MG/ML
4 INJECTION, SOLUTION INTRA-ARTICULAR; INTRALESIONAL; INTRAMUSCULAR; INTRAVENOUS; SOFT TISSUE
Status: DISCONTINUED | OUTPATIENT
Start: 2023-12-22 | End: 2023-12-22 | Stop reason: HOSPADM

## 2023-12-22 RX ORDER — PROPOFOL 10 MG/ML
INJECTION, EMULSION INTRAVENOUS CONTINUOUS PRN
Status: DISCONTINUED | OUTPATIENT
Start: 2023-12-22 | End: 2023-12-22

## 2023-12-22 RX ORDER — ONDANSETRON 4 MG/1
4 TABLET, ORALLY DISINTEGRATING ORAL EVERY 30 MIN PRN
Status: DISCONTINUED | OUTPATIENT
Start: 2023-12-22 | End: 2023-12-22 | Stop reason: HOSPADM

## 2023-12-22 RX ORDER — OXYCODONE HYDROCHLORIDE 5 MG/1
5 TABLET ORAL
Status: DISCONTINUED | OUTPATIENT
Start: 2023-12-22 | End: 2023-12-22 | Stop reason: HOSPADM

## 2023-12-22 RX ORDER — OXYCODONE HYDROCHLORIDE 5 MG/1
10 TABLET ORAL
Status: DISCONTINUED | OUTPATIENT
Start: 2023-12-22 | End: 2023-12-22 | Stop reason: HOSPADM

## 2023-12-22 RX ORDER — ONDANSETRON 2 MG/ML
4 INJECTION INTRAMUSCULAR; INTRAVENOUS EVERY 30 MIN PRN
Status: DISCONTINUED | OUTPATIENT
Start: 2023-12-22 | End: 2023-12-22 | Stop reason: HOSPADM

## 2023-12-22 RX ORDER — ONDANSETRON 4 MG/1
4 TABLET, ORALLY DISINTEGRATING ORAL EVERY 6 HOURS PRN
Status: DISCONTINUED | OUTPATIENT
Start: 2023-12-22 | End: 2023-12-22 | Stop reason: HOSPADM

## 2023-12-22 RX ADMIN — PROPOFOL 150 MCG/KG/MIN: 10 INJECTION, EMULSION INTRAVENOUS at 13:50

## 2023-12-22 RX ADMIN — PROPOFOL 70 MG: 10 INJECTION, EMULSION INTRAVENOUS at 13:50

## 2023-12-22 RX ADMIN — SODIUM CHLORIDE, POTASSIUM CHLORIDE, SODIUM LACTATE AND CALCIUM CHLORIDE: 600; 310; 30; 20 INJECTION, SOLUTION INTRAVENOUS at 13:24

## 2023-12-22 RX ADMIN — LIDOCAINE HYDROCHLORIDE 40 MG: 20 INJECTION, SOLUTION INFILTRATION; PERINEURAL at 13:50

## 2023-12-22 ASSESSMENT — ACTIVITIES OF DAILY LIVING (ADL): ADLS_ACUITY_SCORE: 35

## 2023-12-22 NOTE — ANESTHESIA POSTPROCEDURE EVALUATION
Patient: Hilda Claire    Procedure: Procedure(s):  Colonoscopy       Anesthesia Type:  General    Note:  Disposition: Outpatient   Postop Pain Control: Uneventful            Sign Out: Well controlled pain   PONV: No   Neuro/Psych: Uneventful            Sign Out: Acceptable/Baseline neuro status   Airway/Respiratory: Uneventful            Sign Out: Acceptable/Baseline resp. status   CV/Hemodynamics: Uneventful            Sign Out: Acceptable CV status; No obvious hypovolemia; No obvious fluid overload   Other NRE: NONE   DID A NON-ROUTINE EVENT OCCUR? No           Last vitals:  Vitals Value Taken Time   BP     Temp     Pulse     Resp     SpO2 96 % 12/22/23 1422   Vitals shown include unfiled device data.    Electronically Signed By: MALVIN Alfaro CRNA  December 22, 2023  2:23 PM

## 2023-12-22 NOTE — H&P
General Surgery History and Physical    Hilda Claire MRN# [id number]     Date of Admission: 2023    Reason for Endoscopy  Screening colonoscopy    History of Present Illness  Patient is a 68-year-old woman who presents for her for screening colonoscopy.  She has no GI complaints including rectal bleeding or bloating.  She has no family history of colon cancer or colon polyps for which she is aware.  Tolerated her prep well.  She has had a prior history of a  section but no other abdominal surgeries.    Past Medical History:  Past Medical History:   Diagnosis Date    Cerebral infarction (H)     Excessive or frequent menstruation 10/03/2007    10/3/2007: last PAP with some endometrial cells.  On US uterus and pelvis looks OK.  She reports some continuing menses on a regular basis q 4 weeks since last visit, no intermenstrual flow, LMP=.  Her endometrium is 6 mm which is OK pre-menopausal approaching menopause.  No further workup unless irregular bleeding.    Hypertension     Hypothyroidism        Past Surgical History:  Past Surgical History:   Procedure Laterality Date    ABDOMEN SURGERY  1995        BIOPSY BREAST      SURGICAL HISTORY OF -   1995           Allergies:   No Known Allergies  Medications:  No current facility-administered medications on file prior to encounter.  amLODIPine (NORVASC) 5 MG tablet, TAKE ONE TABLET BY MOUTH ONCE DAILY  levothyroxine (TIROSINT) 75 MCG capsule, Take 75 mcg by mouth daily  metoprolol tartrate (LOPRESSOR) 50 MG tablet, Take 1 tablet (50 mg) by mouth 2 times daily  rosuvastatin (CRESTOR) 20 MG tablet, Take 1 tablet (20 mg) by mouth daily  aspirin 81 MG EC tablet, Take 1 tablet (81 mg) by mouth daily History of stroke.  loperamide (IMODIUM) 2 MG capsule, Take 2 mg by mouth 4 times daily as needed for diarrhea      Social History:  Social History     Socioeconomic History    Marital status:      Spouse name: Not on file     Number of children: Not on file    Years of education: Not on file    Highest education level: Not on file   Occupational History    Not on file   Tobacco Use    Smoking status: Never    Smokeless tobacco: Never   Vaping Use    Vaping Use: Never used   Substance and Sexual Activity    Alcohol use: Yes     Comment: minimal    Drug use: No    Sexual activity: Not Currently     Partners: Male     Birth control/protection: None   Other Topics Concern    Parent/sibling w/ CABG, MI or angioplasty before 65F 55M? No   Social History Narrative    Not on file     Social Determinants of Health     Financial Resource Strain: Low Risk  (12/20/2023)    Financial Resource Strain     Within the past 12 months, have you or your family members you live with been unable to get utilities (heat, electricity) when it was really needed?: No   Food Insecurity: Low Risk  (12/20/2023)    Food Insecurity     Within the past 12 months, did you worry that your food would run out before you got money to buy more?: No     Within the past 12 months, did the food you bought just not last and you didn t have money to get more?: No   Transportation Needs: Low Risk  (12/20/2023)    Transportation Needs     Within the past 12 months, has lack of transportation kept you from medical appointments, getting your medicines, non-medical meetings or appointments, work, or from getting things that you need?: No   Physical Activity: Not on file   Stress: Not on file   Social Connections: Not on file   Interpersonal Safety: Not on file   Housing Stability: Low Risk  (12/20/2023)    Housing Stability     Do you have housing? : Yes     Are you worried about losing your housing?: No     Family History:  Family History   Problem Relation Age of Onset    Arthritis Mother     Osteoporosis Mother         elder    C.A.D. Father         By pass   at 72    Leukemia Father     Diabetes Maternal Grandmother         type 2    Cerebrovascular Disease Paternal Grandmother   "       at older age    Cardiovascular Paternal Grandfather         MI early 70's       ROS:  12 point review negative except as stated in H&P    Exam:  BP (!) 143/80 (BP Location: Right arm)   Pulse 68   Temp 97.5  F (36.4  C) (Oral)   Resp 18   Ht 1.575 m (5' 2\")   Wt 77.1 kg (170 lb)   LMP 06/02/2008   SpO2 99%   BMI 31.09 kg/m    General: Alert, interactive, NAD  Resp: Non-labored breathing  Cardiac: RRR  Abdomen: Soft, non-tender, non-distended    Assessment: 68 year old female doing for screening colonoscopy.  We discussed the details of the procedure. This included a discussion of the risks of bleeding, perforation, and missed lesions. The patient consents to the procedure.    Plan:   - To the Endoscopy suite for planned procedure.       Demian Ribeiro MD  General Surgeon  [facility]  Surgery [address] [address]  Office: [phone]    "

## 2023-12-22 NOTE — ANESTHESIA CARE TRANSFER NOTE
Patient: Hilda Claire    Procedure: Procedure(s):  Colonoscopy       Diagnosis: Colon cancer screening [Z12.11]  Diagnosis Additional Information: No value filed.    Anesthesia Type:   General     Note:    Oropharynx: oropharynx clear of all foreign objects and spontaneously breathing  Level of Consciousness: awake  Oxygen Supplementation: room air    Independent Airway: airway patency satisfactory and stable  Dentition: dentition unchanged  Vital Signs Stable: post-procedure vital signs reviewed and stable  Report to RN Given: handoff report given  Patient transferred to: Phase II    Handoff Report: Identifed the Patient, Identified the Reponsible Provider, Reviewed the pertinent medical history, Discussed the surgical course, Reviewed Intra-OP anesthesia mangement and issues during anesthesia, Set expectations for post-procedure period and Allowed opportunity for questions and acknowledgement of understanding      Vitals:  Vitals Value Taken Time   BP     Temp     Pulse     Resp     SpO2 96 % 12/22/23 1422   Vitals shown include unfiled device data.    Electronically Signed By: MALVIN Alfaro CRNA  December 22, 2023  2:23 PM

## 2023-12-27 ENCOUNTER — OFFICE VISIT (OUTPATIENT)
Dept: FAMILY MEDICINE | Facility: CLINIC | Age: 68
End: 2023-12-27
Payer: MEDICARE

## 2023-12-27 VITALS
WEIGHT: 177 LBS | HEIGHT: 62 IN | BODY MASS INDEX: 32.57 KG/M2 | DIASTOLIC BLOOD PRESSURE: 80 MMHG | HEART RATE: 65 BPM | OXYGEN SATURATION: 99 % | SYSTOLIC BLOOD PRESSURE: 132 MMHG | TEMPERATURE: 98.4 F | RESPIRATION RATE: 18 BRPM

## 2023-12-27 DIAGNOSIS — Z78.9 HEALTH MAINTENANCE ALTERATION: ICD-10-CM

## 2023-12-27 DIAGNOSIS — E03.9 ACQUIRED HYPOTHYROIDISM: ICD-10-CM

## 2023-12-27 DIAGNOSIS — R73.03 PREDIABETES: ICD-10-CM

## 2023-12-27 DIAGNOSIS — Z00.00 ENCOUNTER FOR MEDICARE ANNUAL WELLNESS EXAM: Primary | ICD-10-CM

## 2023-12-27 DIAGNOSIS — I10 ESSENTIAL HYPERTENSION: ICD-10-CM

## 2023-12-27 DIAGNOSIS — Z86.73 HISTORY OF ISCHEMIC STROKE WITHOUT RESIDUAL DEFICITS: ICD-10-CM

## 2023-12-27 DIAGNOSIS — N18.31 STAGE 3A CHRONIC KIDNEY DISEASE (H): ICD-10-CM

## 2023-12-27 DIAGNOSIS — E78.5 DYSLIPIDEMIA: ICD-10-CM

## 2023-12-27 PROCEDURE — G0439 PPPS, SUBSEQ VISIT: HCPCS | Performed by: PHYSICIAN ASSISTANT

## 2023-12-27 PROCEDURE — 99214 OFFICE O/P EST MOD 30 MIN: CPT | Mod: 25 | Performed by: PHYSICIAN ASSISTANT

## 2023-12-27 RX ORDER — ROSUVASTATIN CALCIUM 20 MG/1
20 TABLET, COATED ORAL DAILY
Qty: 90 TABLET | Refills: 3 | Status: SHIPPED | OUTPATIENT
Start: 2023-12-27 | End: 2024-02-05

## 2023-12-27 RX ORDER — METOPROLOL TARTRATE 50 MG
50 TABLET ORAL 2 TIMES DAILY
Qty: 180 TABLET | Refills: 3 | Status: SHIPPED | OUTPATIENT
Start: 2023-12-27

## 2023-12-27 RX ORDER — LOSARTAN POTASSIUM 25 MG/1
25-50 TABLET ORAL DAILY
Qty: 60 TABLET | Refills: 0 | Status: SHIPPED | OUTPATIENT
Start: 2023-12-27 | End: 2024-02-05

## 2023-12-27 RX ORDER — AMLODIPINE BESYLATE 5 MG/1
5 TABLET ORAL DAILY
Qty: 90 TABLET | Refills: 1 | Status: CANCELLED | OUTPATIENT
Start: 2023-12-27

## 2023-12-27 RX ORDER — RESPIRATORY SYNCYTIAL VIRUS VACCINE 120MCG/0.5
0.5 KIT INTRAMUSCULAR ONCE
Qty: 1 EACH | Refills: 0 | Status: CANCELLED | OUTPATIENT
Start: 2023-12-27 | End: 2023-12-27

## 2023-12-27 RX ORDER — LEVOTHYROXINE SODIUM 75 UG/1
75 CAPSULE ORAL DAILY
Qty: 90 CAPSULE | Refills: 0 | Status: SHIPPED | OUTPATIENT
Start: 2023-12-27 | End: 2024-02-03

## 2023-12-27 ASSESSMENT — ACTIVITIES OF DAILY LIVING (ADL): CURRENT_FUNCTION: NO ASSISTANCE NEEDED

## 2023-12-27 ASSESSMENT — ENCOUNTER SYMPTOMS
DIZZINESS: 0
HEADACHES: 0
HEMATOCHEZIA: 0
WEAKNESS: 0
BREAST MASS: 0
HEMATURIA: 0
PALPITATIONS: 0
FEVER: 0
JOINT SWELLING: 0
MYALGIAS: 0
FREQUENCY: 0
ARTHRALGIAS: 0
EYE PAIN: 0
PARESTHESIAS: 0
ABDOMINAL PAIN: 0
SORE THROAT: 0
NERVOUS/ANXIOUS: 0
CHILLS: 0
SHORTNESS OF BREATH: 0
CONSTIPATION: 0
NAUSEA: 0
DYSURIA: 0
HEARTBURN: 0
DIARRHEA: 0
COUGH: 0

## 2023-12-27 ASSESSMENT — PAIN SCALES - GENERAL: PAINLEVEL: NO PAIN (0)

## 2023-12-27 NOTE — PATIENT INSTRUCTIONS
Stop amlodipine  Start losartan.  If cough let me know.  Ideally BP under 130/80.  If not meeting this goal with 1 tab daily, increase to 2 tabs daily and let me know so I can adjust prescription.  Set up lab for 3-4 weeks from now.    Do your health directive      Patient Education   Personalized Prevention Plan  You are due for the preventive services outlined below.  Your care team is available to assist you in scheduling these services.  If you have already completed any of these items, please share that information with your care team to update in your medical record.  Health Maintenance Due   Topic Date Due    COVID-19 Vaccine (1) Never done    Zoster (Shingles) Vaccine (1 of 2) Never done    RSV VACCINE (Pregnancy & 60+) (1 - 1-dose 60+ series) Never done    Pneumococcal Vaccine (1 of 1 - PCV) Never done    Flu Vaccine (1) Never done    Basic Metabolic Panel  12/28/2023    Cholesterol Lab  12/28/2023    Kidney Microalbumin Urine Test  12/28/2023    Hemoglobin  12/28/2023        Patient Education   Personalized Prevention Plan  You are due for the preventive services outlined below.  Your care team is available to assist you in scheduling these services.  If you have already completed any of these items, please share that information with your care team to update in your medical record.  Health Maintenance Due   Topic Date Due    COVID-19 Vaccine (1) Never done    Zoster (Shingles) Vaccine (1 of 2) Never done    RSV VACCINE (Pregnancy & 60+) (1 - 1-dose 60+ series) Never done    Pneumococcal Vaccine (1 of 1 - PCV) Never done    Flu Vaccine (1) Never done    Basic Metabolic Panel  12/28/2023    Cholesterol Lab  12/28/2023    Kidney Microalbumin Urine Test  12/28/2023    Hemoglobin  12/28/2023

## 2023-12-27 NOTE — NURSING NOTE
"Chief Complaint   Patient presents with    Physical       Initial LMP 06/02/2008  Estimated body mass index is 31.09 kg/m  as calculated from the following:    Height as of 12/22/23: 1.575 m (5' 2\").    Weight as of 12/22/23: 77.1 kg (170 lb).    Patient presents to the clinic using No DME    Is there anyone who you would like to be able to receive your results? No  If yes have patient fill out REMEDIOS      "

## 2023-12-27 NOTE — PROGRESS NOTES
"SUBJECTIVE:   Jahaira is a 68 year old, presenting for the following:  Physical        12/27/2023    11:44 AM   Additional Questions   Roomed by lino mcleancma       Are you in the first 12 months of your Medicare coverage?  No    Healthy Habits:     In general, how would you rate your overall health?  Good    Frequency of exercise:  4-5 days/week    Duration of exercise:  15-30 minutes    Do you usually eat at least 4 servings of fruit and vegetables a day, include whole grains    & fiber and avoid regularly eating high fat or \"junk\" foods?  Yes    Taking medications regularly:  Yes    Medication side effects:  None    Ability to successfully perform activities of daily living:  No assistance needed    Home Safety:  No safety concerns identified    Hearing Impairment:  No hearing concerns    In the past 6 months, have you been bothered by leaking of urine?  No    In general, how would you rate your overall mental or emotional health?  Excellent    Additional concerns today:  No    HTN - not checking recently, but had looked good.  No chest pains, chest pressures, SOB or sudden change of endurance.   ACEi intolerance previously.    History stroke.  Rosuvastatin.  No myalgaias.    Hypothyroid.  Has never had symptoms.       doing a radiation treatment q 6 wks for prostate cancer.  Going ok.  Grandchild expected July 2024.    Today's PHQ-2 Score:       12/27/2023    11:39 AM   PHQ-2 ( 1999 [employer])   Q1: Little interest or pleasure in doing things 0   Q2: Feeling down, depressed or hopeless 0   PHQ-2 Score 0   Q1: Little interest or pleasure in doing things Not at all   Q2: Feeling down, depressed or hopeless Not at all   PHQ-2 Score 0     Have you ever done Advance Care Planning? (For example, a Health Directive, POLST, or a discussion with a medical provider or your loved ones about your wishes): No, advance care planning information given to patient to review.  Patient plans to discuss their wishes with loved " ones or provider.      Fall risk  Fallen 2 or more times in the past year?: No  Any fall with injury in the past year?: No    Cognitive Screening   1) Repeat 3 items (Leader, Season, Table)    2) Clock draw: NORMAL  3) 3 item recall: Recalls 3 objects  Results: 3 items recalled: COGNITIVE IMPAIRMENT LESS LIKELY    Mini-CogTM Copyright ROMEO Shahid. Licensed by the author for use in [facility]; reprinted with permission (saeed@[facility]). All rights reserved.      Do you have sleep apnea, excessive snoring or daytime drowsiness? : no    Reviewed and updated as needed this visit by clinical staff   Tobacco  Allergies  Meds  Problems  Med Hx  Surg Hx  Fam Hx          Reviewed and updated as needed this visit by Provider   Tobacco  Allergies  Meds  Problems  Med Hx  Surg Hx  Fam Hx         Social History     Tobacco Use    Smoking status: Never    Smokeless tobacco: Never   Substance Use Topics    Alcohol use: Yes     Comment: minimal         12/20/2023     9:55 AM   Alcohol Use   Prescreen: >3 drinks/day or >7 drinks/week? No     Do you have a current opioid prescription? No  Do you use any other controlled substances or medications that are not prescribed by a provider? None    Current providers sharing in care for this patient include:   Patient Care Team:  Brionna Gonzalez PA-C as PCP - General (Physician Assistant)  Reza Mckeon MD as MD (Neurology)  Deanna Frankel MD as Resident (INTERNAL MEDICINE - ENDOCRINOLOGY, DIABETES & METABOLISM)  Trini Jackson APRN CNP as Assigned PCP    The following health maintenance items are reviewed in Epic and correct as of today:  Health Maintenance   Topic Date Due    COVID-19 Vaccine (1) Never done    ZOSTER IMMUNIZATION (1 of 2) Never done    RSV VACCINE (Pregnancy & 60+) (1 - 1-dose 60+ series) Never done    Pneumococcal Vaccine: 65+ Years (1 of 1 - PCV) Never done    INFLUENZA VACCINE (1) Never done    BMP   12/28/2023    LIPID  12/28/2023    MICROALBUMIN  12/28/2023    HEMOGLOBIN  12/28/2023    TSH W/FREE T4 REFLEX  02/02/2024    MEDICARE ANNUAL WELLNESS VISIT  12/27/2024    ANNUAL REVIEW OF HM ORDERS  12/27/2024    FALL RISK ASSESSMENT  12/27/2024    MAMMO SCREENING  10/03/2025    DTAP/TDAP/TD IMMUNIZATION (3 - Td or Tdap) 11/16/2028    ADVANCE CARE PLANNING  12/27/2028    COLORECTAL CANCER SCREENING  12/22/2033    DEXA  02/15/2038    HEPATITIS C SCREENING  Completed    PHQ-2 (once per calendar year)  Completed    URINALYSIS  Completed    IPV IMMUNIZATION  Aged Out    HPV IMMUNIZATION  Aged Out    MENINGITIS IMMUNIZATION  Aged Out    RSV MONOCLONAL ANTIBODY  Aged Out    PAP  Discontinued         12/19/2021     1:25 PM   Breast CA Risk Assessment (FHS-7)   Do you have a family history of breast, colon, or ovarian cancer? No / Unknown       Mammogram Screening: Recommended mammography every 1-2 years with patient discussion and risk factor consideration  Pertinent mammograms are reviewed under the imaging tab.    Review of Systems   Constitutional:  Negative for chills and fever.   HENT:  Negative for congestion, ear pain, hearing loss and sore throat.    Eyes:  Negative for pain and visual disturbance.   Respiratory:  Negative for cough and shortness of breath.    Cardiovascular:  Negative for chest pain, palpitations and peripheral edema.   Gastrointestinal:  Negative for abdominal pain, constipation, diarrhea, heartburn, hematochezia and nausea.   Breasts:  Negative for tenderness, breast mass and discharge.   Genitourinary:  Negative for dysuria, frequency, genital sores, hematuria, pelvic pain, urgency, vaginal bleeding and vaginal discharge.   Musculoskeletal:  Negative for arthralgias, joint swelling and myalgias.   Skin:  Negative for rash.   Neurological:  Negative for dizziness, weakness, headaches and paresthesias.   Psychiatric/Behavioral:  Negative for mood changes. The patient is not nervous/anxious.   "    OBJECTIVE:   /80   Pulse 65   Temp 98.4  F (36.9  C) (Tympanic)   Resp 18   Ht 1.575 m (5' 2.01\")   Wt 80.3 kg (177 lb)   LMP 06/02/2008   SpO2 99%   BMI 32.37 kg/m   Estimated body mass index is 32.37 kg/m  as calculated from the following:    Height as of this encounter: 1.575 m (5' 2.01\").    Weight as of this encounter: 80.3 kg (177 lb).  Physical Exam  GENERAL: healthy, alert and no distress  NECK: no adenopathy, no asymmetry, masses, or scars and thyroid normal to palpation  RESP: lungs clear to auscultation - no rales, rhonchi or wheezes  CV: regular rate and rhythm, normal S1 S2, no S3 or S4, no murmur, click or rub, no peripheral edema and peripheral pulses strong  PSYCH: mentation appears normal, affect normal/bright    Diagnostic Test Results:  Labs reviewed in Epic    ASSESSMENT / PLAN:   (Z00.00) Encounter for Medicare annual wellness exam  (primary encounter diagnosis)    (N18.31) Stage 3a chronic kidney disease (H)  Comment: monitor, med change to address CKD3, switch from amlodipine to losartan.  Plan: BASIC METABOLIC PANEL, Albumin Random Urine         Quantitative with Creat Ratio, Hemoglobin,         losartan (COZAAR) 25 MG tablet    (I10) Essential hypertension  Comment: stable,  med change to address CKD3, switch from amlodipine to losartan.  Plan: metoprolol tartrate (LOPRESSOR) 50 MG tablet,         losartan (COZAAR) 25 MG tablet    (E78.5) Dyslipidemia  Comment: monitor/refill  Plan: Lipid panel reflex to direct LDL Non-fasting,         rosuvastatin (CRESTOR) 20 MG tablet    (E03.9) Acquired hypothyroidism  Comment: asymptomatic, due for lab, TSH T4 reflex.  Prefers no dose change.  Plan: levothyroxine (TIROSINT) 75 MCG capsule    (R73.03) Prediabetes  Comment: A1c 6.4, recheck  Plan: Hemoglobin A1c    (Z86.73) History of ischemic stroke without residual deficits  Comment: monitor  Plan: Lipid panel reflex to direct LDL Non-fasting,         rosuvastatin (CRESTOR) 20 MG " tablet    She declines vaccines.  Patient Instructions   Stop amlodipine  Start losartan.  If cough let me know.  Ideally BP under 130/80.  If not meeting this goal with 1 tab daily, increase to 2 tabs daily and let me know so I can adjust prescription.  Set up lab for 3-4 weeks from now.    Do your health directive      Patient Education  Personalized Prevention Plan  You are due for the preventive services outlined below.  Your care team is available to assist you in scheduling these services.  If you have already completed any of these items, please share that information with your care team to update in your medical record.  Health Maintenance Due   Topic Date Due    COVID-19 Vaccine (1) Never done    Zoster (Shingles) Vaccine (1 of 2) Never done    RSV VACCINE (Pregnancy & 60+) (1 - 1-dose 60+ series) Never done    Pneumococcal Vaccine (1 of 1 - PCV) Never done    Flu Vaccine (1) Never done    Basic Metabolic Panel  12/28/2023    Cholesterol Lab  12/28/2023    Kidney Microalbumin Urine Test  12/28/2023    Hemoglobin  12/28/2023        Patient Education  Personalized Prevention Plan  You are due for the preventive services outlined below.  Your care team is available to assist you in scheduling these services.  If you have already completed any of these items, please share that information with your care team to update in your medical record.  Health Maintenance Due   Topic Date Due    COVID-19 Vaccine (1) Never done    Zoster (Shingles) Vaccine (1 of 2) Never done    RSV VACCINE (Pregnancy & 60+) (1 - 1-dose 60+ series) Never done    Pneumococcal Vaccine (1 of 1 - PCV) Never done    Flu Vaccine (1) Never done    Basic Metabolic Panel  12/28/2023    Cholesterol Lab  12/28/2023    Kidney Microalbumin Urine Test  12/28/2023    Hemoglobin  12/28/2023          COUNSELING:  Reviewed preventive health counseling, as reflected in patient instructions      BMI:   Estimated body mass index is 32.37 kg/m  as calculated  "from the following:    Height as of this encounter: 1.575 m (5' 2.01\").    Weight as of this encounter: 80.3 kg (177 lb).   Weight management plan: Discussed healthy diet and exercise guidelines      She reports that she has never smoked. She has never used smokeless tobacco.      Appropriate preventive services were discussed with this patient, including applicable screening as appropriate for fall prevention, nutrition, physical activity, Tobacco-use cessation, weight loss and cognition.  Checklist reviewing preventive services available has been given to the patient.    Reviewed patients plan of care and provided an AVS. The Basic Care Plan (routine screening as documented in Health Maintenance) for Hilda meets the Care Plan requirement. This Care Plan has been established and reviewed with the Patient.          Brionna Gonzalez PA-C  [facility]    Identified Health Risks:    "

## 2024-02-02 ENCOUNTER — LAB (OUTPATIENT)
Dept: LAB | Facility: CLINIC | Age: 69
End: 2024-02-02
Payer: MEDICARE

## 2024-02-02 DIAGNOSIS — Z86.73 HISTORY OF ISCHEMIC STROKE WITHOUT RESIDUAL DEFICITS: ICD-10-CM

## 2024-02-02 DIAGNOSIS — N18.31 STAGE 3A CHRONIC KIDNEY DISEASE (H): ICD-10-CM

## 2024-02-02 DIAGNOSIS — R73.03 PREDIABETES: ICD-10-CM

## 2024-02-02 DIAGNOSIS — E03.9 ACQUIRED HYPOTHYROIDISM: ICD-10-CM

## 2024-02-02 DIAGNOSIS — E78.5 DYSLIPIDEMIA: ICD-10-CM

## 2024-02-02 DIAGNOSIS — Z13.1 SCREENING FOR DIABETES MELLITUS: Primary | ICD-10-CM

## 2024-02-02 LAB
ANION GAP SERPL CALCULATED.3IONS-SCNC: 12 MMOL/L (ref 7–15)
BUN SERPL-MCNC: 15.8 MG/DL (ref 8–23)
CALCIUM SERPL-MCNC: 10.4 MG/DL (ref 8.8–10.2)
CHLORIDE SERPL-SCNC: 101 MMOL/L (ref 98–107)
CHOLEST SERPL-MCNC: 136 MG/DL
CREAT SERPL-MCNC: 1.02 MG/DL (ref 0.51–0.95)
CREAT UR-MCNC: 18.9 MG/DL
DEPRECATED HCO3 PLAS-SCNC: 28 MMOL/L (ref 22–29)
EGFRCR SERPLBLD CKD-EPI 2021: 60 ML/MIN/1.73M2
FASTING STATUS PATIENT QL REPORTED: YES
FASTING STATUS PATIENT QL REPORTED: YES
GLUCOSE SERPL-MCNC: 109 MG/DL (ref 70–99)
GLUCOSE SERPL-MCNC: 109 MG/DL (ref 70–99)
HBA1C MFR BLD: 6 % (ref 0–5.6)
HDLC SERPL-MCNC: 56 MG/DL
HGB BLD-MCNC: 13.9 G/DL (ref 11.7–15.7)
LDLC SERPL CALC-MCNC: 44 MG/DL
MICROALBUMIN UR-MCNC: <12 MG/L
MICROALBUMIN/CREAT UR: NORMAL MG/G{CREAT}
NONHDLC SERPL-MCNC: 80 MG/DL
POTASSIUM SERPL-SCNC: 4.2 MMOL/L (ref 3.4–5.3)
SODIUM SERPL-SCNC: 141 MMOL/L (ref 135–145)
TRIGL SERPL-MCNC: 182 MG/DL
TSH SERPL DL<=0.005 MIU/L-ACNC: 0.95 UIU/ML (ref 0.3–4.2)

## 2024-02-02 PROCEDURE — 36415 COLL VENOUS BLD VENIPUNCTURE: CPT

## 2024-02-02 PROCEDURE — 82043 UR ALBUMIN QUANTITATIVE: CPT

## 2024-02-02 PROCEDURE — 85018 HEMOGLOBIN: CPT

## 2024-02-02 PROCEDURE — 80048 BASIC METABOLIC PNL TOTAL CA: CPT

## 2024-02-02 PROCEDURE — 84443 ASSAY THYROID STIM HORMONE: CPT

## 2024-02-02 PROCEDURE — 80061 LIPID PANEL: CPT

## 2024-02-02 PROCEDURE — 83036 HEMOGLOBIN GLYCOSYLATED A1C: CPT

## 2024-02-02 PROCEDURE — 82570 ASSAY OF URINE CREATININE: CPT

## 2024-02-03 DIAGNOSIS — E83.52 HYPERCALCEMIA: Primary | ICD-10-CM

## 2024-02-03 DIAGNOSIS — E03.9 ACQUIRED HYPOTHYROIDISM: ICD-10-CM

## 2024-02-03 RX ORDER — LEVOTHYROXINE SODIUM 75 UG/1
75 CAPSULE ORAL DAILY
Qty: 90 CAPSULE | Refills: 3 | Status: SHIPPED | OUTPATIENT
Start: 2024-02-03 | End: 2024-02-05 | Stop reason: DRUGHIGH

## 2024-02-04 ENCOUNTER — MYC MEDICAL ADVICE (OUTPATIENT)
Dept: FAMILY MEDICINE | Facility: CLINIC | Age: 69
End: 2024-02-04
Payer: MEDICARE

## 2024-02-04 DIAGNOSIS — I10 ESSENTIAL HYPERTENSION: Primary | ICD-10-CM

## 2024-02-04 DIAGNOSIS — E03.9 ACQUIRED HYPOTHYROIDISM: ICD-10-CM

## 2024-02-04 DIAGNOSIS — Z86.73 HISTORY OF ISCHEMIC STROKE WITHOUT RESIDUAL DEFICITS: ICD-10-CM

## 2024-02-04 DIAGNOSIS — N18.31 STAGE 3A CHRONIC KIDNEY DISEASE (H): ICD-10-CM

## 2024-02-04 DIAGNOSIS — E78.5 DYSLIPIDEMIA: ICD-10-CM

## 2024-02-04 NOTE — RESULT ENCOUNTER NOTE
Jahaira  Blood sugars remain in prediabetes range but have improved from last year.  Normal hemoglobin.  Normal urine protein.  Kidney function stable.  Cholesterol looks pretty nice.  Thyroid dose looks fine.  We could potentially decrease slightly if you preferred.  I tentatively sent refills of same dose x 1 year.  Calcium level in your blood is up mildly.  This is NOT due to amount of calcium that you eat/drink.  Typically we do recheck with a repeat lab, but it does often come back down.  I get the sense that you don't really like testing, so its up to you if you want to set a lab-only appt to recheck this.  Otherwise I'll just monitor it again next year.  Let me know if you have questions.  Brionna

## 2024-02-05 DIAGNOSIS — I10 ESSENTIAL HYPERTENSION: ICD-10-CM

## 2024-02-05 DIAGNOSIS — N18.31 STAGE 3A CHRONIC KIDNEY DISEASE (H): ICD-10-CM

## 2024-02-05 RX ORDER — LEVOTHYROXINE SODIUM 50 UG/1
50 CAPSULE ORAL DAILY
Qty: 90 CAPSULE | Refills: 3 | Status: SHIPPED | OUTPATIENT
Start: 2024-02-05

## 2024-02-05 RX ORDER — ROSUVASTATIN CALCIUM 10 MG/1
10 TABLET, COATED ORAL DAILY
Qty: 90 TABLET | Refills: 3 | Status: SHIPPED | OUTPATIENT
Start: 2024-02-05

## 2024-02-05 RX ORDER — LOSARTAN POTASSIUM 100 MG/1
100 TABLET ORAL DAILY
Qty: 90 TABLET | Refills: 3 | Status: SHIPPED | OUTPATIENT
Start: 2024-02-05

## 2024-04-25 ENCOUNTER — HOSPITAL ENCOUNTER (OUTPATIENT)
Dept: MAMMOGRAPHY | Facility: CLINIC | Age: 69
Discharge: HOME | End: 2024-04-25
Attending: PHYSICIAN ASSISTANT
Payer: MEDICARE

## 2024-04-25 DIAGNOSIS — R92.8 CATEGORY 3 MAMMOGRAPHY RESULT WITH SHORT FOLLOW-UP INTERVAL SUGGESTED FOR PROBABLY BENIGN FINDING: ICD-10-CM

## 2024-04-25 PROCEDURE — 77062 BREAST TOMOSYNTHESIS BI: CPT

## 2024-05-14 ENCOUNTER — HOSPITAL ENCOUNTER (OUTPATIENT)
Dept: MAMMOGRAPHY | Facility: CLINIC | Age: 69
Discharge: HOME | End: 2024-05-14
Attending: PHYSICIAN ASSISTANT
Payer: MEDICARE

## 2024-05-14 DIAGNOSIS — R92.8 OTHER ABNORMAL AND INCONCLUSIVE FINDINGS ON DIAGNOSTIC IMAGING OF BREAST: ICD-10-CM

## 2024-05-14 DIAGNOSIS — N64.89 BREAST ASYMMETRY: ICD-10-CM

## 2024-05-14 PROCEDURE — 88305 TISSUE EXAM BY PATHOLOGIST: CPT | Mod: TC | Performed by: PHYSICIAN ASSISTANT

## 2024-05-14 PROCEDURE — 250N000009 HC RX 250: Performed by: PHYSICIAN ASSISTANT

## 2024-05-14 PROCEDURE — 88305 TISSUE EXAM BY PATHOLOGIST: CPT | Mod: 26 | Performed by: STUDENT IN AN ORGANIZED HEALTH CARE EDUCATION/TRAINING PROGRAM

## 2024-05-14 PROCEDURE — 19081 BX BREAST 1ST LESION STRTCTC: CPT | Mod: LT

## 2024-05-14 PROCEDURE — 272N000032 MA STEREOTACTIC BREAST BIOPSY VACUUM LT

## 2024-05-14 PROCEDURE — 999N000065 MA POST PROCEDURE LEFT

## 2024-05-14 RX ADMIN — LIDOCAINE HYDROCHLORIDE 5 ML: 10 INJECTION, SOLUTION EPIDURAL; INFILTRATION; INTRACAUDAL; PERINEURAL at 14:06

## 2024-05-16 ENCOUNTER — TELEPHONE (OUTPATIENT)
Dept: MAMMOGRAPHY | Facility: CLINIC | Age: 69
End: 2024-05-16
Payer: MEDICARE

## 2024-05-16 LAB
PATH REPORT.COMMENTS IMP SPEC: NORMAL
PATH REPORT.FINAL DX SPEC: NORMAL
PATH REPORT.GROSS SPEC: NORMAL
PATH REPORT.MICROSCOPIC SPEC OTHER STN: NORMAL
PATH REPORT.RELEVANT HX SPEC: NORMAL
PHOTO IMAGE: NORMAL

## 2024-05-16 NOTE — TELEPHONE ENCOUNTER
After review by [facility] Radiologist, Dr. Endy Santana, Jahaira was called,  verified, and given her 2024 LEFT Breast Biopsy results (Benign--see below) and recommended Follow up (Annual Screening Mammograms).   Patient denies any concerns with the biopsy site. Ordering provider was informed of the results and follow up plan.  I encouraged her to contact her doctor with any further breast concerns.  Patient verbalized understanding and agrees with the plan of care.        Yeimy Mo RN BSN  Procedure Nurse  [facility]  [phone]    --------------------------------------------------------------------------------------------------------------------------------------      [facility]  Hilda Claire [id number]  F, 1955  Surgical Pathology Report (Final result) [id number]  Authorizing Provider: Brionna Gonzalez PA-C Ordering Provider: Brionna Gonzalez PA-C  Ordering Location: [facility]  Imaging  Collected: 2024 01:24 PM  Pathologist: Socorro Mark MD Received: 2024 02:03 PM  .  Specimens  A Breast, Left  .  .  Final Diagnosis  A. Breast, left, upper outer quadrant, 11 cm from nipple, 12 mm size, biopsy:  -Sclerotic breast tissue with fibroadenomatoid changes.  Electronically signed by Socorro Mark MD on 2024 at 10:39 AM

## 2024-05-17 NOTE — RESULT ENCOUNTER NOTE
Jahaira  Good news - breast biopsy result was negative - NO cancer and no concerns.  Let me know if you have questions.  Brionna

## 2024-11-27 ENCOUNTER — PATIENT OUTREACH (OUTPATIENT)
Dept: CARE COORDINATION | Facility: CLINIC | Age: 69
End: 2024-11-27
Payer: MEDICARE

## 2024-12-11 ENCOUNTER — PATIENT OUTREACH (OUTPATIENT)
Dept: CARE COORDINATION | Facility: CLINIC | Age: 69
End: 2024-12-11
Payer: MEDICARE

## 2025-01-02 DIAGNOSIS — I10 ESSENTIAL HYPERTENSION: ICD-10-CM

## 2025-01-02 RX ORDER — METOPROLOL TARTRATE 50 MG
50 TABLET ORAL 2 TIMES DAILY
Qty: 180 TABLET | Refills: 0 | Status: SHIPPED | OUTPATIENT
Start: 2025-01-02

## 2025-01-04 SDOH — HEALTH STABILITY: PHYSICAL HEALTH: ON AVERAGE, HOW MANY MINUTES DO YOU ENGAGE IN EXERCISE AT THIS LEVEL?: 20 MIN

## 2025-01-04 SDOH — HEALTH STABILITY: PHYSICAL HEALTH: ON AVERAGE, HOW MANY DAYS PER WEEK DO YOU ENGAGE IN MODERATE TO STRENUOUS EXERCISE (LIKE A BRISK WALK)?: 7 DAYS

## 2025-01-04 ASSESSMENT — SOCIAL DETERMINANTS OF HEALTH (SDOH): HOW OFTEN DO YOU GET TOGETHER WITH FRIENDS OR RELATIVES?: PATIENT DECLINED

## 2025-01-08 ENCOUNTER — VIRTUAL VISIT (OUTPATIENT)
Dept: FAMILY MEDICINE | Facility: CLINIC | Age: 70
End: 2025-01-08
Payer: MEDICARE

## 2025-01-08 DIAGNOSIS — N18.31 STAGE 3A CHRONIC KIDNEY DISEASE (H): ICD-10-CM

## 2025-01-08 DIAGNOSIS — R73.03 PREDIABETES: ICD-10-CM

## 2025-01-08 DIAGNOSIS — E78.5 DYSLIPIDEMIA: ICD-10-CM

## 2025-01-08 DIAGNOSIS — I10 ESSENTIAL HYPERTENSION: ICD-10-CM

## 2025-01-08 DIAGNOSIS — Z86.73 HISTORY OF ISCHEMIC STROKE WITHOUT RESIDUAL DEFICITS: ICD-10-CM

## 2025-01-08 DIAGNOSIS — Z00.00 ENCOUNTER FOR MEDICARE ANNUAL WELLNESS EXAM: Primary | ICD-10-CM

## 2025-01-08 DIAGNOSIS — E03.9 ACQUIRED HYPOTHYROIDISM: ICD-10-CM

## 2025-01-08 RX ORDER — ROSUVASTATIN CALCIUM 10 MG/1
10 TABLET, COATED ORAL DAILY
Qty: 90 TABLET | Refills: 3 | Status: SHIPPED | OUTPATIENT
Start: 2025-01-08

## 2025-01-08 RX ORDER — LOSARTAN POTASSIUM 100 MG/1
100 TABLET ORAL DAILY
Qty: 90 TABLET | Refills: 3 | Status: SHIPPED | OUTPATIENT
Start: 2025-01-08

## 2025-01-08 RX ORDER — METOPROLOL TARTRATE 50 MG
50 TABLET ORAL 2 TIMES DAILY
Qty: 180 TABLET | Refills: 3 | Status: SHIPPED | OUTPATIENT
Start: 2025-01-08

## 2025-01-08 NOTE — PATIENT INSTRUCTIONS
Set up lab appt     Do your healthcare directive    Decide if mammogram will be every 1 yr or every 2.  Due in May 2025 if wanting yearly.    Increase exercise slightly - goal 30 min/day      Patient Education   Preventive Care Advice   This is general advice given by our system to help you stay healthy. However, your care team may have specific advice just for you. Please talk to your care team about your preventive care needs.  Nutrition  Eat 5 or more servings of fruits and vegetables each day.  Try wheat bread, brown rice and whole grain pasta (instead of white bread, rice, and pasta).  Get enough calcium and vitamin D. Check the label on foods and aim for 100% of the RDA (recommended daily allowance).  Lifestyle  Exercise at least 150 minutes each week  (30 minutes a day, 5 days a week).  Do muscle strengthening activities 2 days a week. These help control your weight and prevent disease.  No smoking.  Wear sunscreen to prevent skin cancer.  Have a dental exam and cleaning every 6 months.  Yearly exams  See your health care team every year to talk about:  Any changes in your health.  Any medicines your care team has prescribed.  Preventive care, family planning, and ways to prevent chronic diseases.  Shots (vaccines)   HPV shots (up to age 26), if you've never had them before.  Hepatitis B shots (up to age 59), if you've never had them before.  COVID-19 shot: Get this shot when it's due.  Flu shot: Get a flu shot every year.  Tetanus shot: Get a tetanus shot every 10 years.  Pneumococcal, hepatitis A, and RSV shots: Ask your care team if you need these based on your risk.  Shingles shot (for age 50 and up)  General health tests  Diabetes screening:  Starting at age 35, Get screened for diabetes at least every 3 years.  If you are younger than age 35, ask your care team if you should be screened for diabetes.  Cholesterol test: At age 39, start having a cholesterol test every 5 years, or more often if  advised.  Bone density scan (DEXA): At age 50, ask your care team if you should have this scan for osteoporosis (brittle bones).  Hepatitis C: Get tested at least once in your life.  STIs (sexually transmitted infections)  Before age 24: Ask your care team if you should be screened for STIs.  After age 24: Get screened for STIs if you're at risk. You are at risk for STIs (including HIV) if:  You are sexually active with more than one person.  You don't use condoms every time.  You or a partner was diagnosed with a sexually transmitted infection.  If you are at risk for HIV, ask about PrEP medicine to prevent HIV.  Get tested for HIV at least once in your life, whether you are at risk for HIV or not.  Cancer screening tests  Cervical cancer screening: If you have a cervix, begin getting regular cervical cancer screening tests starting at age 21.  Breast cancer scan (mammogram): If you've ever had breasts, begin having regular mammograms starting at age 40. This is a scan to check for breast cancer.  Colon cancer screening: It is important to start screening for colon cancer at age 45.  Have a colonoscopy test every 10 years (or more often if you're at risk) Or, ask your provider about stool tests like a FIT test every year or Cologuard test every 3 years.  To learn more about your testing options, visit:   .  For help making a decision, visit:   https://bit.ly/eg71833.  Prostate cancer screening test: If you have a prostate, ask your care team if a prostate cancer screening test (PSA) at age 55 is right for you.  Lung cancer screening: If you are a current or former smoker ages 50 to 80, ask your care team if ongoing lung cancer screenings are right for you.  For informational purposes only. Not to replace the advice of your health care provider. Copyright   2023 [employer]. All rights reserved. Clinically reviewed by the [facility] Transitions Program. [employer] [id number] - REV 01/24.

## 2025-01-08 NOTE — NURSING NOTE
"Chief Complaint   Patient presents with    Physical       Initial LMP 06/02/2008  Estimated body mass index is 32.37 kg/m  as calculated from the following:    Height as of 12/27/23: 1.575 m (5' 2.01\").    Weight as of 12/27/23: 80.3 kg (177 lb).    Patient presents to the clinic using No DME    Is there anyone who you would like to be able to receive your results? No  If yes have patient fill out REMEDIOS      "

## 2025-01-08 NOTE — PROGRESS NOTES
Preventive Care Visit  [facility]  Brionna Gonzalez PA-C, Family Medicine  Jan 8, 2025    Jahaira is a 69 year old who is being evaluated via a billable video visit.    How would you like to obtain your AVS? MyChart  If the video visit is dropped, the invitation should be resent by: Text to cell phone: [phone]  Will anyone else be joining your video visit? No      Assessment & Plan     Encounter for Medicare annual wellness exam    Stage 3a chronic kidney disease (H)  refill  - losartan (COZAAR) 100 MG tablet; Take 1 tablet (100 mg) by mouth daily.  - Albumin Random Urine Quantitative with Creat Ratio; Future    Essential hypertension  Refill stable  - losartan (COZAAR) 100 MG tablet; Take 1 tablet (100 mg) by mouth daily.  - metoprolol tartrate (LOPRESSOR) 50 MG tablet; Take 1 tablet (50 mg) by mouth 2 times daily.    Dyslipidemia  History of ischemic stroke without residual deficits  monitor  - rosuvastatin (CRESTOR) 10 MG tablet; Take 1 tablet (10 mg) by mouth daily.  - Lipid panel reflex to direct LDL Non-fasting; Future    Acquired hypothyroidism  Monitor and refill  - TSH with free T4 reflex; Future    Prediabetes  monitor  - Hemoglobin A1c; Future    Counseling  Appropriate preventive services were addressed with this patient via screening, questionnaire, or discussion as appropriate for fall prevention, nutrition, physical activity, Tobacco-use cessation, social engagement, weight loss and cognition.  Checklist reviewing preventive services available has been given to the patient.  Reviewed patient's diet, addressing concerns and/or questions.     Patient Instructions   Set up lab appt     Do your healthcare directive    Decide if mammogram will be every 1 yr or every 2.  Due in May 2025 if wanting yearly.    Increase exercise slightly - goal 30 min/day      Patient Education  Preventive Care Advice   This is general advice given by our system to help you stay healthy.  However, your care team may have specific advice just for you. Please talk to your care team about your preventive care needs.  Nutrition  Eat 5 or more servings of fruits and vegetables each day.  Try wheat bread, brown rice and whole grain pasta (instead of white bread, rice, and pasta).  Get enough calcium and vitamin D. Check the label on foods and aim for 100% of the RDA (recommended daily allowance).  Lifestyle  Exercise at least 150 minutes each week  (30 minutes a day, 5 days a week).  Do muscle strengthening activities 2 days a week. These help control your weight and prevent disease.  No smoking.  Wear sunscreen to prevent skin cancer.  Have a dental exam and cleaning every 6 months.  Yearly exams  See your health care team every year to talk about:  Any changes in your health.  Any medicines your care team has prescribed.  Preventive care, family planning, and ways to prevent chronic diseases.  Shots (vaccines)   HPV shots (up to age 26), if you've never had them before.  Hepatitis B shots (up to age 59), if you've never had them before.  COVID-19 shot: Get this shot when it's due.  Flu shot: Get a flu shot every year.  Tetanus shot: Get a tetanus shot every 10 years.  Pneumococcal, hepatitis A, and RSV shots: Ask your care team if you need these based on your risk.  Shingles shot (for age 50 and up)  General health tests  Diabetes screening:  Starting at age 35, Get screened for diabetes at least every 3 years.  If you are younger than age 35, ask your care team if you should be screened for diabetes.  Cholesterol test: At age 39, start having a cholesterol test every 5 years, or more often if advised.  Bone density scan (DEXA): At age 50, ask your care team if you should have this scan for osteoporosis (brittle bones).  Hepatitis C: Get tested at least once in your life.  STIs (sexually transmitted infections)  Before age 24: Ask your care team if you should be screened for STIs.  After age 24: Get  screened for STIs if you're at risk. You are at risk for STIs (including HIV) if:  You are sexually active with more than one person.  You don't use condoms every time.  You or a partner was diagnosed with a sexually transmitted infection.  If you are at risk for HIV, ask about PrEP medicine to prevent HIV.  Get tested for HIV at least once in your life, whether you are at risk for HIV or not.  Cancer screening tests  Cervical cancer screening: If you have a cervix, begin getting regular cervical cancer screening tests starting at age 21.  Breast cancer scan (mammogram): If you've ever had breasts, begin having regular mammograms starting at age 40. This is a scan to check for breast cancer.  Colon cancer screening: It is important to start screening for colon cancer at age 45.  Have a colonoscopy test every 10 years (or more often if you're at risk) Or, ask your provider about stool tests like a FIT test every year or Cologuard test every 3 years.  To learn more about your testing options, visit:   .  For help making a decision, visit:   https://bit.ly/kn43181.  Prostate cancer screening test: If you have a prostate, ask your care team if a prostate cancer screening test (PSA) at age 55 is right for you.  Lung cancer screening: If you are a current or former smoker ages 50 to 80, ask your care team if ongoing lung cancer screenings are right for you.  For informational purposes only. Not to replace the advice of your health care provider. Copyright    [facility]. All rights reserved. Clinically reviewed by the [facility]. [employer] [id number] - REV .         Pastor Campo is a 69 year old, presenting for the following:  Physical        2025     4:02 PM   Additional Questions   Roomed by lino mclean cma     Video Start Time: 4:10 PM    HPI    2nd grandkid came 3 months early.  Born in April.  Doing pretty well overall.    in .  Feels she has plenty  of support.    HTN - varies, today 120/69.  No chest pains, chest pressures, SOB or sudden change of endurance.   Off/on runny nose which she attributes to losartan - noticeable but not bad.    No myalgia.  Some numbness in left 2-3rd fingers, some in thumb as well over past 2 months.  Has improved some.    Health Care Directive  Patient does not have a Health Care Directive: Discussed advance care planning with patient; however, patient declined at this time.      1/4/2025   General Health   How would you rate your overall physical health? Good   Feel stress (tense, anxious, or unable to sleep) Not at all         1/4/2025   Nutrition   Diet: Regular (no restrictions)         1/4/2025   Exercise   Days per week of moderate/strenous exercise 7 days   Average minutes spent exercising at this level 20 min         1/4/2025   Social Factors   Frequency of gathering with friends or relatives Patient declined   Worry food won't last until get money to buy more No   Food not last or not have enough money for food? No   Do you have housing? (Housing is defined as stable permanent housing and does not include staying ouside in a car, in a tent, in an abandoned building, in an overnight shelter, or couch-surfing.) Yes   Are you worried about losing your housing? No   Lack of transportation? No   Unable to get utilities (heat,electricity)? No         1/4/2025   Fall Risk   Fallen 2 or more times in the past year? No   Trouble with walking or balance? No          1/4/2025   Activities of Daily Living- Home Safety   Needs help with the following daily activites None of the above   Safety concerns in the home None of the above         1/4/2025   Dental   Dentist two times every year? Yes         1/4/2025   Hearing Screening   Hearing concerns? None of the above         1/4/2025   Driving Risk Screening   Patient/family members have concerns about driving No         1/4/2025   General Alertness/Fatigue Screening   Have you been  more tired than usual lately? No         1/4/2025   Urinary Incontinence Screening   Bothered by leaking urine in past 6 months No         1/4/2025   TB Screening   Were you born outside of the [place]? No     Today's PHQ-2 Score:       1/8/2025     4:02 PM   PHQ-2 ( 1999 Pf[employer])   Q1: Little interest or pleasure in doing things 0   Q2: Feeling down, depressed or hopeless 0   PHQ-2 Score 0         1/4/2025   Substance Use   Alcohol more than 3/day or more than 7/wk No   Do you have a current opioid prescription? No   How severe/bad is pain from 1 to 10? 0/10 (No Pain)   Do you use any other substances recreationally? (!) DECLINE     Social History     Tobacco Use    Smoking status: Never    Smokeless tobacco: Never   Vaping Use    Vaping status: Never Used   Substance Use Topics    Alcohol use: Yes     Comment: minimal    Drug use: No         4/25/2024   LAST FHS-7 RESULTS   1st degree relative breast or ovarian cancer No   Any relative bilateral breast cancer No   Any male have breast cancer No   Any ONE woman have BOTH breast AND ovarian cancer No   Any woman with breast cancer before 50yrs No   2 or more relatives with breast AND/OR ovarian cancer No   2 or more relatives with breast AND/OR bowel cancer No        Mammogram Screening - Mammogram every 1-2 years updated in Health Maintenance based on mutual decision making      History of abnormal Pap smear: No - age 65 or older with adequate negative prior screening test results (3 consecutive negative cytology results, 2 consecutive negative cotesting results, or 2 consecutive negative HrHPV test results within 10 years, with the most recent test occurring within the recommended screening interval for the test used)        Latest Ref Rng & Units 11/12/2020     4:24 PM 11/12/2020     4:10 PM 11/11/2016     8:30 AM   PAP / HPV   PAP (Historical)  NIL      HPV 16 DNA NEG^Negative  Negative  Negative    HPV 18 DNA NEG^Negative  Negative  Negative    Other HR HPV  "NEG^Negative  Negative  Negative      ASCVD Risk   The ASCVD Risk score (Ubaldo CARRANZA, et al., 2019) failed to calculate for the following reasons:    Risk score cannot be calculated because patient has a medical history suggesting prior/existing ASCVD    Reviewed and updated as needed this visit by Provider                    Current providers sharing in care for this patient include:  Patient Care Team:  Brionna Gonzalez PA-C as PCP - General (Physician Assistant)  Reza Mckeon MD as MD (Neurology)  Deanna Frankel MD as Resident (INTERNAL MEDICINE - ENDOCRINOLOGY, DIABETES & METABOLISM)  Brionna Gonzalez PA-C as Assigned PCP    The following health maintenance items are reviewed in Epic and correct as of today:  Health Maintenance   Topic Date Due    Pneumococcal Vaccine: 50+ Years (1 of 1 - PCV) Never done    ZOSTER IMMUNIZATION (1 of 2) Never done    INFLUENZA VACCINE (1) Never done    COVID-19 Vaccine (1 - 2024-25 season) Never done    ANNUAL REVIEW OF HM ORDERS  12/27/2024    MEDICARE ANNUAL WELLNESS VISIT  12/27/2024    BMP  02/02/2025    LIPID  02/02/2025    MICROALBUMIN  02/02/2025    TSH W/FREE T4 REFLEX  02/02/2025    HEMOGLOBIN  02/02/2025    FALL RISK ASSESSMENT  01/08/2026    MAMMO SCREENING  04/25/2026    GLUCOSE  02/02/2027    DTAP/TDAP/TD IMMUNIZATION (3 - Td or Tdap) 11/16/2028    ADVANCE CARE PLANNING  12/27/2028    RSV VACCINE (1 - 1-dose 75+ series) 09/08/2032    COLORECTAL CANCER SCREENING  12/22/2033    DEXA  02/15/2038    HEPATITIS C SCREENING  Completed    PHQ-2 (once per calendar year)  Completed    URINALYSIS  Completed    HPV IMMUNIZATION  Aged Out    MENINGITIS IMMUNIZATION  Aged Out    RSV MONOCLONAL ANTIBODY  Aged Out    PAP  Discontinued        Objective    Exam  LMP 06/02/2008    Estimated body mass index is 32.37 kg/m  as calculated from the following:    Height as of 12/27/23: 1.575 m (5' 2.01\").    Weight as of 12/27/23: 80.3 kg (177 " kerri).    Physical Exam  Video visit      1/8/2025   Mini Cog   Mini-Cog Not Completed (choose reason) Patient declines        Video-Visit Details    Type of service:  Video Visit   Video End Time:4:24 PM  Originating Location (pt. Location): Home    Distant Location (provider location):  On-site  Platform used for Video Visit: Kellen  Signed Electronically by: Brionna Gonzalez PA-C

## 2025-02-05 ENCOUNTER — LAB (OUTPATIENT)
Dept: LAB | Facility: CLINIC | Age: 70
End: 2025-02-05
Attending: PHYSICIAN ASSISTANT
Payer: MEDICARE

## 2025-02-05 DIAGNOSIS — N18.31 STAGE 3A CHRONIC KIDNEY DISEASE (H): ICD-10-CM

## 2025-02-05 DIAGNOSIS — E03.9 ACQUIRED HYPOTHYROIDISM: ICD-10-CM

## 2025-02-05 DIAGNOSIS — R73.03 PREDIABETES: ICD-10-CM

## 2025-02-05 DIAGNOSIS — E78.5 DYSLIPIDEMIA: ICD-10-CM

## 2025-02-05 DIAGNOSIS — E83.52 HYPERCALCEMIA: ICD-10-CM

## 2025-02-05 DIAGNOSIS — N18.30 CKD (CHRONIC KIDNEY DISEASE) STAGE 3, GFR 30-59 ML/MIN (H): Primary | ICD-10-CM

## 2025-02-05 LAB
ANION GAP SERPL CALCULATED.3IONS-SCNC: 9 MMOL/L (ref 7–15)
BUN SERPL-MCNC: 19.4 MG/DL (ref 8–23)
CALCIUM SERPL-MCNC: 9.9 MG/DL (ref 8.8–10.4)
CHLORIDE SERPL-SCNC: 99 MMOL/L (ref 98–107)
CHOLEST SERPL-MCNC: 165 MG/DL
CREAT SERPL-MCNC: 1.2 MG/DL (ref 0.51–0.95)
CREAT UR-MCNC: 17.9 MG/DL
EGFRCR SERPLBLD CKD-EPI 2021: 49 ML/MIN/1.73M2
EST. AVERAGE GLUCOSE BLD GHB EST-MCNC: 137 MG/DL
FASTING STATUS PATIENT QL REPORTED: NO
FASTING STATUS PATIENT QL REPORTED: NO
GLUCOSE SERPL-MCNC: 114 MG/DL (ref 70–99)
HBA1C MFR BLD: 6.4 % (ref 0–5.6)
HCO3 SERPL-SCNC: 29 MMOL/L (ref 22–29)
HDLC SERPL-MCNC: 52 MG/DL
HGB BLD-MCNC: 13 G/DL (ref 11.7–15.7)
LDLC SERPL CALC-MCNC: 79 MG/DL
MICROALBUMIN UR-MCNC: <12 MG/L
MICROALBUMIN/CREAT UR: NORMAL MG/G{CREAT}
NONHDLC SERPL-MCNC: 113 MG/DL
POTASSIUM SERPL-SCNC: 4.3 MMOL/L (ref 3.4–5.3)
SODIUM SERPL-SCNC: 137 MMOL/L (ref 135–145)
T4 FREE SERPL-MCNC: 1.09 NG/DL (ref 0.9–1.7)
TRIGL SERPL-MCNC: 172 MG/DL
TSH SERPL DL<=0.005 MIU/L-ACNC: 9.32 UIU/ML (ref 0.3–4.2)

## 2025-02-05 PROCEDURE — 82043 UR ALBUMIN QUANTITATIVE: CPT

## 2025-02-05 PROCEDURE — 84439 ASSAY OF FREE THYROXINE: CPT

## 2025-02-05 PROCEDURE — 85018 HEMOGLOBIN: CPT

## 2025-02-05 PROCEDURE — 80048 BASIC METABOLIC PNL TOTAL CA: CPT

## 2025-02-05 PROCEDURE — 36415 COLL VENOUS BLD VENIPUNCTURE: CPT

## 2025-02-05 PROCEDURE — 80061 LIPID PANEL: CPT

## 2025-02-05 PROCEDURE — 83036 HEMOGLOBIN GLYCOSYLATED A1C: CPT

## 2025-02-05 PROCEDURE — 84443 ASSAY THYROID STIM HORMONE: CPT

## 2025-02-05 PROCEDURE — 82570 ASSAY OF URINE CREATININE: CPT

## 2025-02-06 DIAGNOSIS — E03.9 ACQUIRED HYPOTHYROIDISM: ICD-10-CM

## 2025-02-07 NOTE — RESULT ENCOUNTER NOTE
"Which option does she pick?    Jahaira  Hemoglobin is normal.  Cholesterol looks pretty good.  A1c went up, from 6.0 to 6.4.  This is just barely still in prediabetes rather than diabetes sugar levels.  It has fluctuated in this range for about 7 yrs.  Strongly recommend weight loss, daily exercise, balancing eating to avoid too many \"carbs\" (bread, rice, pasta, potatoes, juice, alcohol) at any meal.    Kidney function dipped down a bit.  It was at these levels in the past but had been doing better for multiple years.  Thyroid lab is not within range.  I suggest a dose increase, but I know you do not like increases.  Option of keeping dose the same, rechecking in 3 months with another lab, versus dose adjustment now and repeat lab in 6-12 weeks.  Which plan do you prefer?  Let me know so that I can place the orders and med prescription.  Brionna"

## 2025-04-07 ENCOUNTER — MYC REFILL (OUTPATIENT)
Dept: FAMILY MEDICINE | Facility: CLINIC | Age: 70
End: 2025-04-07
Payer: MEDICARE

## 2025-04-07 DIAGNOSIS — Z86.73 HISTORY OF ISCHEMIC STROKE WITHOUT RESIDUAL DEFICITS: ICD-10-CM

## 2025-04-07 DIAGNOSIS — E78.5 DYSLIPIDEMIA: ICD-10-CM

## 2025-04-08 RX ORDER — ROSUVASTATIN CALCIUM 10 MG/1
10 TABLET, COATED ORAL DAILY
Qty: 90 TABLET | Refills: 3 | Status: SHIPPED | OUTPATIENT
Start: 2025-04-08

## 2025-04-14 ENCOUNTER — MYC REFILL (OUTPATIENT)
Dept: FAMILY MEDICINE | Facility: CLINIC | Age: 70
End: 2025-04-14
Payer: MEDICARE

## 2025-04-14 DIAGNOSIS — I10 ESSENTIAL HYPERTENSION: ICD-10-CM

## 2025-04-14 RX ORDER — METOPROLOL TARTRATE 50 MG
50 TABLET ORAL 2 TIMES DAILY
Qty: 180 TABLET | Refills: 2 | Status: SHIPPED | OUTPATIENT
Start: 2025-04-14

## 2025-05-20 ENCOUNTER — LAB (OUTPATIENT)
Dept: LAB | Facility: CLINIC | Age: 70
End: 2025-05-20
Payer: MEDICARE

## 2025-05-20 DIAGNOSIS — E03.9 ACQUIRED HYPOTHYROIDISM: ICD-10-CM

## 2025-05-20 LAB
T4 FREE SERPL-MCNC: 0.97 NG/DL (ref 0.9–1.7)
TSH SERPL DL<=0.005 MIU/L-ACNC: 10.65 UIU/ML (ref 0.3–4.2)

## 2025-05-20 PROCEDURE — 84439 ASSAY OF FREE THYROXINE: CPT

## 2025-05-20 PROCEDURE — 36415 COLL VENOUS BLD VENIPUNCTURE: CPT

## 2025-05-20 PROCEDURE — 84443 ASSAY THYROID STIM HORMONE: CPT

## 2025-05-21 ENCOUNTER — RESULTS FOLLOW-UP (OUTPATIENT)
Dept: FAMILY MEDICINE | Facility: CLINIC | Age: 70
End: 2025-05-21
Payer: MEDICARE

## 2025-05-21 DIAGNOSIS — E03.9 ACQUIRED HYPOTHYROIDISM: ICD-10-CM

## 2025-05-21 NOTE — RESULT ENCOUNTER NOTE
Jahaira    Thyroid lab worsened slightly.  I recommend dose increase.  Let me know if you are willing to try dose increase or if you are choosing to stay on same dose.    Brionna

## 2025-05-28 RX ORDER — LEVOTHYROXINE SODIUM 75 UG/1
75 TABLET ORAL
Qty: 90 TABLET | Refills: 0 | Status: SHIPPED | OUTPATIENT
Start: 2025-05-28

## 2025-08-24 DIAGNOSIS — E03.9 ACQUIRED HYPOTHYROIDISM: ICD-10-CM

## 2025-08-26 RX ORDER — LEVOTHYROXINE SODIUM 75 UG/1
75 CAPSULE ORAL
Qty: 90 CAPSULE | Refills: 0 | OUTPATIENT
Start: 2025-08-26

## (undated) RX ORDER — LIDOCAINE HYDROCHLORIDE 10 MG/ML
INJECTION, SOLUTION EPIDURAL; INFILTRATION; INTRACAUDAL; PERINEURAL
Status: DISPENSED
Start: 2024-05-14